# Patient Record
Sex: FEMALE | Race: ASIAN | NOT HISPANIC OR LATINO
[De-identification: names, ages, dates, MRNs, and addresses within clinical notes are randomized per-mention and may not be internally consistent; named-entity substitution may affect disease eponyms.]

---

## 2020-12-16 ENCOUNTER — APPOINTMENT (OUTPATIENT)
Dept: ORTHOPEDIC SURGERY | Facility: CLINIC | Age: 65
End: 2020-12-16
Payer: COMMERCIAL

## 2020-12-16 ENCOUNTER — NON-APPOINTMENT (OUTPATIENT)
Age: 65
End: 2020-12-16

## 2020-12-16 DIAGNOSIS — Z86.39 PERSONAL HISTORY OF OTHER ENDOCRINE, NUTRITIONAL AND METABOLIC DISEASE: ICD-10-CM

## 2020-12-16 DIAGNOSIS — Z86.79 PERSONAL HISTORY OF OTHER DISEASES OF THE CIRCULATORY SYSTEM: ICD-10-CM

## 2020-12-16 PROCEDURE — 99204 OFFICE O/P NEW MOD 45 MIN: CPT

## 2020-12-16 PROCEDURE — 72110 X-RAY EXAM L-2 SPINE 4/>VWS: CPT

## 2020-12-16 PROCEDURE — 99072 ADDL SUPL MATRL&STAF TM PHE: CPT

## 2020-12-16 NOTE — PHYSICAL EXAM
[de-identified] : General: No acute distress, conversant, well-nourished.\par Head: Normocephalic, atraumatic\par Neck: trachea midline, FROM\par Heart: normotensive and normal rate and rhythm\par Lungs: No labored breathing\par Skin: No abrasions, no rashes, no edema\par Psych: Alert and oriented to person, place and time\par Extremities: no peripheral edema or digital cyanosis\par Gait: Normal gait. Can perform tandem gait.  \par Vascular: warm and well perfused distally, palpable distal pulses\par \par MSK:\par Lumbar spine:\par + tenderness to palpation.  No step-off, no deformity.\par \par NEURO EXAM:\par Sensation \par Left L2  -  2/2            \par Left L3  -  2/2\par Left L4  -  2/2\par Left L5  -  2/2\par Left S1  -  2/2\par \par Right L2  -  2/2            \par Right L3  -  2/2\par Right L4  -  2/2\par Right L5  -  2/2\par Right S1  -  2/2\par \par Motor: \par Left L2 (hip flexion)                            5/5                \par Left L3 (knee extension)                   5/5                \par Left L4 (ankle dorsiflexion)                 5/5                \par Left L5 (long toe extensor)                5/5                \par Left S1 (ankle plantar flexion)           5/5\par \par Right L2 (hip flexion)                            5/5                \par Right L3 (knee extension)                   5/5                \par Right L4 (ankle dorsiflexion)                 5/5                \par Right L5 (long toe extensor)                5/5                \par Right S1 (ankle plantar flexion)           5/5\par \par Reflexes: Normal and symmetric\par Negative clonus.  Down-going Babinski.   [de-identified] : Lumbar AP, lateral, flexion and extension radiographs obtained in the office today shows no fracture or dislocation.  Straightening of lumbar spine.  Lumbar spondylosis. No instability on dynamic films.

## 2020-12-16 NOTE — HISTORY OF PRESENT ILLNESS
[de-identified] : 65 year old female presents with low back pain radiating into her right thigh.  She cannot recall an inciting event or trauma.  She says the pain is worse with activity or prolonged standing.  She has been taking Tylenol without relief.  Rest helps.  She does report paresthesias.  She denies any weakness, balance problems, urinary retention or fecal incontinence.

## 2020-12-16 NOTE — ASSESSMENT
[FreeTextEntry1] : 65 year old female with low back pain radiating to her right thigh.  She has paresthesias but is otherwise neurologically intact.  She was given a referral for physical therapy. She was given prescriptions for diclofenac and gabapentin.  She will followup in 1 month.  She knows to call with any questions or concerns or if her symptoms acutely worsen.

## 2021-02-17 ENCOUNTER — APPOINTMENT (OUTPATIENT)
Dept: ORTHOPEDIC SURGERY | Facility: CLINIC | Age: 66
End: 2021-02-17
Payer: COMMERCIAL

## 2021-02-17 ENCOUNTER — TRANSCRIPTION ENCOUNTER (OUTPATIENT)
Age: 66
End: 2021-02-17

## 2021-02-17 DIAGNOSIS — R20.0 ANESTHESIA OF SKIN: ICD-10-CM

## 2021-02-17 DIAGNOSIS — R20.2 ANESTHESIA OF SKIN: ICD-10-CM

## 2021-02-17 PROCEDURE — 99214 OFFICE O/P EST MOD 30 MIN: CPT

## 2021-02-17 PROCEDURE — 99072 ADDL SUPL MATRL&STAF TM PHE: CPT

## 2021-02-17 NOTE — HISTORY OF PRESENT ILLNESS
[de-identified] : 65 year old female returns for followup of low back pain radiating right thigh.  The pain has now started to radiate all the way down to her ankle.  She has been taking gabapentin and Diclofenac with minimal relief.  She has been doing physical therapy without improvement.  She reports numbness and paresthesias in her legs right worse than left.  She denies any weakness, balance problems, urinary retention or fecal incontinence.

## 2021-02-17 NOTE — ASSESSMENT
[FreeTextEntry1] : 65 year old female followup for lumbar radiculopathy.  She was last seen 2 months ago.  She has paresthesias but is otherwise neurologically intact.  She has been doing physical therapy without improvement. She has been taking gabapentin and diclofenac without improvement.  The pain is now radiating down to her right ankle.  She was given a referral for a lumbar MRI.   She will continue PT.  She will followup once the MRI has been completed.  She knows to call with any questions or concerns or if her symptoms acutely worsen.

## 2021-02-17 NOTE — PHYSICAL EXAM
[de-identified] : General: No acute distress, conversant, well-nourished.\par Head: Normocephalic, atraumatic\par Neck: trachea midline, FROM\par Heart: normotensive and normal rate and rhythm\par Lungs: No labored breathing\par Skin: No abrasions, no rashes, no edema\par Psych: Alert and oriented to person, place and time\par Extremities: no peripheral edema or digital cyanosis\par Gait: Normal gait. Can perform tandem gait.  \par Vascular: warm and well perfused distally, palpable distal pulses\par \par MSK:\par Lumbar spine:\par + tenderness to palpation.  No step-off, no deformity.\par \par NEURO EXAM:\par Sensation \par Left L2  -  2/2            \par Left L3  -  2/2\par Left L4  -  2/2\par Left L5  -  2/2\par Left S1  -  2/2\par \par Right L2  -  2/2            \par Right L3  -  2/2\par Right L4  -  2/2\par Right L5  -  2/2\par Right S1  -  2/2\par \par Motor: \par Left L2 (hip flexion)                            5/5                \par Left L3 (knee extension)                   5/5                \par Left L4 (ankle dorsiflexion)                 5/5                \par Left L5 (long toe extensor)                5/5                \par Left S1 (ankle plantar flexion)           5/5\par \par Right L2 (hip flexion)                            5/5                \par Right L3 (knee extension)                   5/5                \par Right L4 (ankle dorsiflexion)                 5/5                \par Right L5 (long toe extensor)                5/5                \par Right S1 (ankle plantar flexion)           5/5\par \par Reflexes: Normal and symmetric\par Negative clonus.  Down-going Babinski.   [de-identified] : Lumbar AP, lateral, flexion and extension radiographs (12/16/20) no fracture or dislocation.  Straightening of lumbar spine.  Lumbar spondylosis. No instability on dynamic films.

## 2021-02-25 ENCOUNTER — APPOINTMENT (OUTPATIENT)
Dept: ORTHOPEDIC SURGERY | Facility: CLINIC | Age: 66
End: 2021-02-25
Payer: COMMERCIAL

## 2021-02-25 PROCEDURE — 99214 OFFICE O/P EST MOD 30 MIN: CPT

## 2021-02-25 PROCEDURE — 99072 ADDL SUPL MATRL&STAF TM PHE: CPT

## 2021-02-26 NOTE — ASSESSMENT
[FreeTextEntry1] : 65 year old female with severe lumbar stenosis with neurogenic claudication and lumbar radiculopathy.  Her symptoms have progressively worsened over the last 3 months.  The pain limits the distance she can walk forcing her to sit and rest. She reports paresthesias in the legs but is otherwise neurologically intact.  She has tried physical therapy without relief.  She as tried Diclofenac and gabapentin without improvement in her symtpoms.  We reviewed her lumbar MRI which showed congenital stenosis and well as severe L4-L5 central lumbar stenosis.  We discussed surgery and its alternatives.  Surgery would be a lumbar decompression: L3-L5 lumbar laminectomy, foraminotomy and partial facetectomy.  We discussed the risks and benefits of surgery.  Risks include anesthesia, blood loss, need for blood transfusion, clots, stroke, myocardial infarct, chronic pain, loss of function, infection, durotomy, damage to neurovascular structures and need for reoperation.  The patient asked several great questions all of which were answered.  The patient consented to proceed with surgery.  I also spoke with her neurologist Dr Sandoval regarding this plan.  The patient is having her Covid-19 vaccine and we will schedule her surgery over 2 weeks after her second dose.  She will require medical clearance and a covid-19 test prior to surgery.  She will followup prior to the surgery to review the information.  She knows to call with any questions or concerns or if her symptoms acutely worsen.

## 2021-02-26 NOTE — HISTORY OF PRESENT ILLNESS
[de-identified] : 65 year old female returns for followup for low back pain radiating to her legs: right worse than left.   She reports worsening leg pain and fatigue when walking.  She can only walk 1-2 blocks before the discomfort starts to bother her.  Sitting helps.  She also reports continued paresthesias into her legs when walking. She denies any weakness, balance problems, urinary retention or fecal incontinence. She has been doing physical therapy without relief.  She has also tried Diclofenac and gabapentin without relief.  She is here to review her recent lumbar MRI.  She also saw neurologist Matt Sandoval MD.

## 2021-02-26 NOTE — PHYSICAL EXAM
[de-identified] : General: No acute distress, conversant, well-nourished.\par Head: Normocephalic, atraumatic\par Neck: trachea midline, FROM\par Heart: normotensive and normal rate and rhythm\par Lungs: No labored breathing\par Skin: No abrasions, no rashes, no edema\par Psych: Alert and oriented to person, place and time\par Extremities: no peripheral edema or digital cyanosis\par Gait: Normal gait. Can perform tandem gait.  \par Vascular: warm and well perfused distally, palpable distal pulses\par \par MSK:\par Lumbar spine:\par + tenderness to palpation.  No step-off, no deformity.\par \par NEURO EXAM:\par Sensation \par Left L2  -  2/2            \par Left L3  -  2/2\par Left L4  -  2/2\par Left L5  -  2/2\par Left S1  -  2/2\par \par Right L2  -  2/2            \par Right L3  -  2/2\par Right L4  -  2/2\par Right L5  -  2/2\par Right S1  -  2/2\par \par Motor: \par Left L2 (hip flexion)                            5/5                \par Left L3 (knee extension)                   5/5                \par Left L4 (ankle dorsiflexion)                 5/5                \par Left L5 (long toe extensor)                5/5                \par Left S1 (ankle plantar flexion)           5/5\par \par Right L2 (hip flexion)                            5/5                \par Right L3 (knee extension)                   5/5                \par Right L4 (ankle dorsiflexion)                 5/5                \par Right L5 (long toe extensor)                5/5                \par Right S1 (ankle plantar flexion)           5/5\par \par Reflexes: Normal and symmetric\par Negative clonus.  Down-going Babinski.   [de-identified] : Lumbar MRI (2/23/21): Congenital stenosis leading to multilevel lumbar stenosis.  Moderate L3-L4 central stenosis.  Severe spinal canal stenosis at L4-L5 and mild to moderate multilevel bilateral foraminal stenoses.\par \par Lumbar AP, lateral, flexion and extension radiographs (12/16/20) no fracture or dislocation.  Straightening of lumbar spine.  Lumbar spondylosis. No instability on dynamic films.

## 2021-03-26 ENCOUNTER — APPOINTMENT (OUTPATIENT)
Dept: ORTHOPEDIC SURGERY | Facility: CLINIC | Age: 66
End: 2021-03-26
Payer: COMMERCIAL

## 2021-03-26 PROCEDURE — 99214 OFFICE O/P EST MOD 30 MIN: CPT

## 2021-03-26 PROCEDURE — 99072 ADDL SUPL MATRL&STAF TM PHE: CPT

## 2021-03-26 NOTE — ASSESSMENT
[FreeTextEntry1] : 65 year old female with severe lumbar stenosis with neurogenic claudication and lumbar radiculopathy. She has attempted conservative treatment with physical therapy, gabapentin and DIclofenac without sustained relief.  Her activities of daily living are impaired by the pain and the distance she walks is limited.  We reviewed her lumbar MRI which showed congenital stenosis and well as severe L4-L5 central lumbar stenosis.  We reviewed surgery and its alternatives.  Surgery would be a lumbar decompression: L3-L5 lumbar laminectomy, foraminotomy and partial facetectomy.  We discussed the risks and benefits of surgery.  Risks include anesthesia, blood loss, need for blood transfusion, clots, stroke, myocardial infarct, chronic pain, loss of function, infection, durotomy, damage to neurovascular structures and need for reoperation.  The patient asked several great questions all of which were answered.  The patient consented to proceed with surgery. She is scheduled for April 12, 2021.  She will be seeing her PCP for clearance and will require a Covid-19 test prior to surgery.  She knows to call with any questions or concerns or if her symptoms acutely worsen.

## 2021-03-26 NOTE — HISTORY OF PRESENT ILLNESS
[de-identified] : 65 year old female for lumbar stenosis with neurogenic claudication and radiculopathy.  She has been doing PT which helps but the pain in her legs still bothers her.  She has paresthesias as well.  She is taking gabapentin and Diclofenac with minimal relief.  She denies any weakness, balance problems, urinary retention or fecal incontinence. She has been doing physical therapy without relief. She is limited in her daily activities by the pain.

## 2021-03-26 NOTE — PHYSICAL EXAM
[de-identified] : General: No acute distress, conversant, well-nourished.\par Head: Normocephalic, atraumatic\par Neck: trachea midline, FROM\par Heart: normotensive and normal rate and rhythm\par Lungs: No labored breathing\par Skin: No abrasions, no rashes, no edema\par Psych: Alert and oriented to person, place and time\par Extremities: no peripheral edema or digital cyanosis\par Gait: Normal gait. Can perform tandem gait.  \par Vascular: warm and well perfused distally, palpable distal pulses\par \par MSK:\par Lumbar spine:\par + tenderness to palpation.  No step-off, no deformity.\par \par NEURO EXAM:\par Sensation \par Left L2  -  2/2            \par Left L3  -  2/2\par Left L4  -  2/2\par Left L5  -  2/2\par Left S1  -  2/2\par \par Right L2  -  2/2            \par Right L3  -  2/2\par Right L4  -  2/2\par Right L5  -  2/2\par Right S1  -  2/2\par \par Motor: \par Left L2 (hip flexion)                            5/5                \par Left L3 (knee extension)                   5/5                \par Left L4 (ankle dorsiflexion)                 5/5                \par Left L5 (long toe extensor)                5/5                \par Left S1 (ankle plantar flexion)           5/5\par \par Right L2 (hip flexion)                            5/5                \par Right L3 (knee extension)                   5/5                \par Right L4 (ankle dorsiflexion)                 5/5                \par Right L5 (long toe extensor)                5/5                \par Right S1 (ankle plantar flexion)           5/5\par \par Reflexes: Normal and symmetric\par Negative clonus.  Down-going Babinski.   [de-identified] : Lumbar MRI (2/23/21): Congenital stenosis leading to multilevel lumbar stenosis.  Moderate L3-L4 central stenosis.  Severe spinal canal stenosis at L4-L5 and mild to moderate multilevel bilateral foraminal stenoses.\par \par Lumbar AP, lateral, flexion and extension radiographs (12/16/20) no fracture or dislocation.  Straightening of lumbar spine.  Lumbar spondylosis. No instability on dynamic films.

## 2021-04-09 RX ORDER — POVIDONE-IODINE 5 %
1 AEROSOL (ML) TOPICAL ONCE
Refills: 0 | Status: COMPLETED | OUTPATIENT
Start: 2021-04-12 | End: 2021-04-12

## 2021-04-09 NOTE — H&P ADULT - NSICDXFAMILYHX_GEN_ALL_CORE_FT
FAMILY HISTORY:  Father  Still living? Unknown  Family history of CHF (congestive heart failure), Age at diagnosis: Age Unknown

## 2021-04-09 NOTE — H&P ADULT - NSHPLABSRESULTS_GEN_ALL_CORE
Cr: 0.75  preop testing  Echo: WNL per medical clearance 4/9  EKG: WNL per medical clearance   Preop CBC, BMP, PT/PTT/INR, UA within normal limits- reviewed by medical clearance.   CXR: Within normal limits, per medical clearance.

## 2021-04-09 NOTE — H&P ADULT - NSHPPHYSICALEXAM_GEN_ALL_CORE
GENERAL:  PE: GENERAL:  PE:  Decreased ROM secondary to pain. Rest of PE per medical clearance. GENERAL: Comfortable, pleasant, NAD, alert.   PE: Decreased ROM secondary to pain.   No deformity or open wounds. No rashes or lesions. EHL/TA/GS/FHL 5/5 BLE.  Sensation intact but decreased to left lateral foot compared to right. Skin warm and well perfused. DP palpable BLE. Cap refill brisk.   Rest of PE per medical clearance.

## 2021-04-09 NOTE — H&P ADULT - PROBLEM SELECTOR PLAN 1
Admit to Orthopaedic Service.  Presents today for elective L3-L5 LAMINECTOMY, FORAMINOTOMY, AND PARTIAL FACETECTOMY.  Pt medically stable and cleared for procedure today by  Admit to Orthopaedic Service.  Presents today for elective L3-L5 laminectomy, foraminotomy, partial facetectomy. Pt medically stable and cleared for procedure today by Dr. Hollis (Cardio), Dr. Rodriguez (Med)

## 2021-04-09 NOTE — PATIENT PROFILE ADULT - SBIRT REFERRAL
1125 pt tolerated taxol infusion without issue, pt to rtc 11/4/19, no distress noted upon d/c to home   SBIRT referral

## 2021-04-09 NOTE — H&P ADULT - HISTORY OF PRESENT ILLNESS
66yo f c/o low back pain x   Presents today for elective L3-L5 LAMINECTOMY, FORAMINOTOMY, AND PARTIAL FACETECTOMY.  64yo f c/o low back pain x 10 years without specific accident or injury to bring on pain. Pt reports she carried heavy objects/computers for years which she attributes to the pain. Pain radiates down left leg greater than right and is associated with some intermittent numbness and tingling on her left leg. Pt ambulates with no assistance at baseline.   Presents today for elective L3-L5 laminectomy, foraminotomy, partial facetectomy.

## 2021-04-11 ENCOUNTER — TRANSCRIPTION ENCOUNTER (OUTPATIENT)
Age: 66
End: 2021-04-11

## 2021-04-12 ENCOUNTER — APPOINTMENT (OUTPATIENT)
Dept: ORTHOPEDIC SURGERY | Facility: HOSPITAL | Age: 66
End: 2021-04-12

## 2021-04-12 ENCOUNTER — INPATIENT (INPATIENT)
Facility: HOSPITAL | Age: 66
LOS: 0 days | Discharge: HOME | DRG: 520 | End: 2021-04-13
Attending: STUDENT IN AN ORGANIZED HEALTH CARE EDUCATION/TRAINING PROGRAM | Admitting: STUDENT IN AN ORGANIZED HEALTH CARE EDUCATION/TRAINING PROGRAM
Payer: COMMERCIAL

## 2021-04-12 VITALS
SYSTOLIC BLOOD PRESSURE: 115 MMHG | RESPIRATION RATE: 20 BRPM | OXYGEN SATURATION: 97 % | DIASTOLIC BLOOD PRESSURE: 71 MMHG | HEART RATE: 69 BPM | TEMPERATURE: 99 F | HEIGHT: 65 IN | WEIGHT: 135.58 LBS

## 2021-04-12 DIAGNOSIS — E78.5 HYPERLIPIDEMIA, UNSPECIFIED: ICD-10-CM

## 2021-04-12 DIAGNOSIS — Z98.890 OTHER SPECIFIED POSTPROCEDURAL STATES: Chronic | ICD-10-CM

## 2021-04-12 DIAGNOSIS — M54.9 DORSALGIA, UNSPECIFIED: ICD-10-CM

## 2021-04-12 LAB
BLD GP AB SCN SERPL QL: NEGATIVE — SIGNIFICANT CHANGE UP
RH IG SCN BLD-IMP: POSITIVE — SIGNIFICANT CHANGE UP

## 2021-04-12 PROCEDURE — 63048 LAM FACETEC &FORAMOT EA ADDL: CPT

## 2021-04-12 PROCEDURE — 99253 IP/OBS CNSLTJ NEW/EST LOW 45: CPT

## 2021-04-12 PROCEDURE — 63047 LAM FACETEC & FORAMOT LUMBAR: CPT

## 2021-04-12 RX ORDER — CEFAZOLIN SODIUM 1 G
2000 VIAL (EA) INJECTION EVERY 8 HOURS
Refills: 0 | Status: COMPLETED | OUTPATIENT
Start: 2021-04-12 | End: 2021-04-13

## 2021-04-12 RX ORDER — ONDANSETRON 8 MG/1
4 TABLET, FILM COATED ORAL EVERY 6 HOURS
Refills: 0 | Status: DISCONTINUED | OUTPATIENT
Start: 2021-04-12 | End: 2021-04-13

## 2021-04-12 RX ORDER — BUPIVACAINE 13.3 MG/ML
20 INJECTION, SUSPENSION, LIPOSOMAL INFILTRATION ONCE
Refills: 0 | Status: DISCONTINUED | OUTPATIENT
Start: 2021-04-12 | End: 2021-04-13

## 2021-04-12 RX ORDER — CHLORHEXIDINE GLUCONATE 213 G/1000ML
1 SOLUTION TOPICAL ONCE
Refills: 0 | Status: COMPLETED | OUTPATIENT
Start: 2021-04-12 | End: 2021-04-12

## 2021-04-12 RX ORDER — FLUTICASONE PROPIONATE 50 MCG
1 SPRAY, SUSPENSION NASAL
Refills: 0 | Status: DISCONTINUED | OUTPATIENT
Start: 2021-04-12 | End: 2021-04-13

## 2021-04-12 RX ORDER — FLUTICASONE PROPIONATE 220 MCG
1 AEROSOL WITH ADAPTER (GRAM) INHALATION
Qty: 0 | Refills: 0 | DISCHARGE

## 2021-04-12 RX ORDER — APREPITANT 80 MG/1
40 CAPSULE ORAL ONCE
Refills: 0 | Status: COMPLETED | OUTPATIENT
Start: 2021-04-12 | End: 2021-04-12

## 2021-04-12 RX ORDER — ATORVASTATIN CALCIUM 80 MG/1
40 TABLET, FILM COATED ORAL AT BEDTIME
Refills: 0 | Status: DISCONTINUED | OUTPATIENT
Start: 2021-04-12 | End: 2021-04-13

## 2021-04-12 RX ORDER — SENNA PLUS 8.6 MG/1
2 TABLET ORAL AT BEDTIME
Refills: 0 | Status: DISCONTINUED | OUTPATIENT
Start: 2021-04-12 | End: 2021-04-13

## 2021-04-12 RX ORDER — SODIUM CHLORIDE 9 MG/ML
1000 INJECTION, SOLUTION INTRAVENOUS
Refills: 0 | Status: DISCONTINUED | OUTPATIENT
Start: 2021-04-13 | End: 2021-04-13

## 2021-04-12 RX ORDER — GABAPENTIN 400 MG/1
300 CAPSULE ORAL ONCE
Refills: 0 | Status: COMPLETED | OUTPATIENT
Start: 2021-04-12 | End: 2021-04-12

## 2021-04-12 RX ORDER — HYDROMORPHONE HYDROCHLORIDE 2 MG/ML
0.5 INJECTION INTRAMUSCULAR; INTRAVENOUS; SUBCUTANEOUS EVERY 4 HOURS
Refills: 0 | Status: COMPLETED | OUTPATIENT
Start: 2021-04-12 | End: 2021-04-19

## 2021-04-12 RX ORDER — GABAPENTIN 400 MG/1
0 CAPSULE ORAL
Qty: 0 | Refills: 0 | DISCHARGE

## 2021-04-12 RX ORDER — OXYCODONE HYDROCHLORIDE 5 MG/1
10 TABLET ORAL EVERY 4 HOURS
Refills: 0 | Status: DISCONTINUED | OUTPATIENT
Start: 2021-04-12 | End: 2021-04-13

## 2021-04-12 RX ORDER — GABAPENTIN 400 MG/1
100 CAPSULE ORAL DAILY
Refills: 0 | Status: DISCONTINUED | OUTPATIENT
Start: 2021-04-12 | End: 2021-04-13

## 2021-04-12 RX ORDER — PANTOPRAZOLE SODIUM 20 MG/1
40 TABLET, DELAYED RELEASE ORAL
Refills: 0 | Status: DISCONTINUED | OUTPATIENT
Start: 2021-04-12 | End: 2021-04-13

## 2021-04-12 RX ORDER — HYDROMORPHONE HYDROCHLORIDE 2 MG/ML
0.5 INJECTION INTRAMUSCULAR; INTRAVENOUS; SUBCUTANEOUS
Refills: 0 | Status: COMPLETED | OUTPATIENT
Start: 2021-04-12 | End: 2021-04-12

## 2021-04-12 RX ORDER — BUDESONIDE, GLYCOPYRROLATE, AND FORMOTEROL FUMARATE 160; 9; 4.8 UG/1; UG/1; UG/1
2 AEROSOL, METERED RESPIRATORY (INHALATION)
Qty: 0 | Refills: 0 | DISCHARGE

## 2021-04-12 RX ORDER — OXYCODONE HYDROCHLORIDE 5 MG/1
5 TABLET ORAL EVERY 4 HOURS
Refills: 0 | Status: DISCONTINUED | OUTPATIENT
Start: 2021-04-12 | End: 2021-04-13

## 2021-04-12 RX ORDER — ACETAMINOPHEN 500 MG
975 TABLET ORAL EVERY 8 HOURS
Refills: 0 | Status: DISCONTINUED | OUTPATIENT
Start: 2021-04-12 | End: 2021-04-13

## 2021-04-12 RX ORDER — ACETAMINOPHEN 500 MG
1000 TABLET ORAL ONCE
Refills: 0 | Status: COMPLETED | OUTPATIENT
Start: 2021-04-12 | End: 2021-04-12

## 2021-04-12 RX ADMIN — Medication 100 MILLIGRAM(S): at 17:43

## 2021-04-12 RX ADMIN — HYDROMORPHONE HYDROCHLORIDE 0.5 MILLIGRAM(S): 2 INJECTION INTRAMUSCULAR; INTRAVENOUS; SUBCUTANEOUS at 13:21

## 2021-04-12 RX ADMIN — Medication 1 APPLICATION(S): at 06:36

## 2021-04-12 RX ADMIN — CHLORHEXIDINE GLUCONATE 1 APPLICATION(S): 213 SOLUTION TOPICAL at 06:37

## 2021-04-12 RX ADMIN — ATORVASTATIN CALCIUM 40 MILLIGRAM(S): 80 TABLET, FILM COATED ORAL at 22:13

## 2021-04-12 RX ADMIN — GABAPENTIN 300 MILLIGRAM(S): 400 CAPSULE ORAL at 06:37

## 2021-04-12 RX ADMIN — SENNA PLUS 2 TABLET(S): 8.6 TABLET ORAL at 22:13

## 2021-04-12 RX ADMIN — Medication 1 SPRAY(S): at 23:07

## 2021-04-12 RX ADMIN — APREPITANT 40 MILLIGRAM(S): 80 CAPSULE ORAL at 06:37

## 2021-04-12 RX ADMIN — Medication 1000 MILLIGRAM(S): at 06:36

## 2021-04-12 NOTE — PACU DISCHARGE NOTE - COMMENTS
Verbal report given to JEISON guzman v/zeferino boyd, pt. to be transferred to [facility],[id number], pt. voicing no complaints.

## 2021-04-12 NOTE — CONSULT NOTE ADULT - ASSESSMENT
66 yo Female with Pmhx of chronic low back pain, admitted for elective L3-L5 laminectomy, foraminotomy, partial facetectomy with post induction course complicated by sinus bradycardia for which she recieved Atropine with good response    1) SInus Bradycardia Post Anesthesia for Orthopedic procedure  -Patient with Bradycardia to the 30's after induction per report. No rythm strip available in chart for analysis. Reportedly Sinus bradycardia. Patient recieved 0.5 mg IV of atropine with good response  -Clinically patient is post surgery feeling well without complaints.  -EKG reviewed showing NSR and TWI from V1-V4 present on pre op EKG. Pts heart on the monitor ranging from 70's -80's with good chronotropy with moving around  -At this time believed marked sinus bradycardia related to anesthesia  -Would Ensure Electrolytes within normal range  -Would keep on tele post operatively   -Keep K>4 and Mag>2.  -Please continue to avoid any AVN agents  -Please obtain daily EKGs  -Cardiology will follow

## 2021-04-12 NOTE — CONSULT NOTE ADULT - ATTENDING COMMENTS
Initial attending contact date   4/12/21   . See fellow note written above for details. I reviewed the fellow documentation. I have personally seen and examined this patient. I reviewed vitals, labs, medications, cardiac studies, and additional imaging. I agree with the above fellow's findings and plans as written above with the following additions/statements.    -Cardiology consulted for mao in 30s post induction  -likely sinus mao related to anesthesia  -in PACU, pt with HR 60-80s  -Without complaints of dizziness, SOB, CP  -EKG NSR with ST changes unchanged from pre-op EKG  - No need for further cardiac work up at this time  -Will cont to monitor on tele

## 2021-04-12 NOTE — CONSULT NOTE ADULT - SUBJECTIVE AND OBJECTIVE BOX
Patient is a 64 yo Female with Pmhx of chronic low back pain, admitted for elective L3-L5 laminectomy, foraminotomy, partial facetectomy with post induction course complicated by sinus bradycardia for which she recieved Atropine with good response    Patient seen and examined at bedside in the PACU. Reports feeling well but sleepy from her anesthesia. Pt's pre -op echo showed Ventricular bigeminy with extra PVCs for which her outpatientr cardiology did an echo that was reportedly normal. Currently patient with HR in the 80's range without pause, PVCS or arrythmias.         Home Medications:  Aspirin Enteric Coated 81 mg oral delayed release tablet: 1 tab(s) orally once a day (12 Apr 2021 06:29)  Breztri Aerosphere inhalation aerosol: 2 puff(s) inhaled 2 times a day (12 Apr 2021 06:29)  Crestor 10 mg oral tablet: 1 tab(s) orally once a day (at bedtime) (12 Apr 2021 06:29)  D3:   2,000 IU orally daily (12 Apr 2021 06:29)  diclofenac sodium 75 mg oral delayed release tablet: 1 tab(s) orally 2 times a day (12 Apr 2021 06:29)  fluticasone 50 mcg/inh inhalation powder: 1 puff(s) inhaled 2 times a day (12 Apr 2021 06:29)  gabapentin 100 mg oral tablet: orally once a day (12 Apr 2021 06:29)      MEDICATIONS  (STANDING):  atorvastatin 40 milliGRAM(s) Oral at bedtime  BUpivacaine liposome 1.3% Injectable (no eMAR) 20 milliLiter(s) Local Injection once  ceFAZolin   IVPB 2000 milliGRAM(s) IV Intermittent every 8 hours  fluticasone propionate 50 MICROgram(s)/spray Nasal Spray 1 Spray(s) Both Nostrils two times a day  gabapentin 100 milliGRAM(s) Oral daily  pantoprazole    Tablet 40 milliGRAM(s) Oral before breakfast  senna 2 Tablet(s) Oral at bedtime    MEDICATIONS  (PRN):  acetaminophen   Tablet .. 975 milliGRAM(s) Oral every 8 hours PRN Temp greater or equal to 38C (100.4F), Mild Pain (1 - 3)  HYDROmorphone  Injectable 0.5 milliGRAM(s) IV Push every 4 hours PRN breakthrough pain  ondansetron Injectable 4 milliGRAM(s) IV Push every 6 hours PRN Nausea and/or Vomiting  oxyCODONE    IR 5 milliGRAM(s) Oral every 4 hours PRN Moderate Pain (4 - 6)  oxyCODONE    IR 10 milliGRAM(s) Oral every 4 hours PRN Severe Pain (7 - 10)    .  VITAL SIGNS:  T(C): 36.4 (04-12-21 @ 15:16), Max: 37.1 (04-12-21 @ 06:08)  T(F): 97.6 (04-12-21 @ 15:16), Max: 98.7 (04-12-21 @ 06:08)  HR: 87 (04-12-21 @ 15:16) (69 - 91)  BP: 111/62 (04-12-21 @ 15:16) (97/52 - 121/66)  BP(mean): 80 (04-12-21 @ 15:16) (66 - 88)  RR: 10 (04-12-21 @ 15:16) (10 - 24)  SpO2: 100% (04-12-21 @ 15:16) (97% - 100%)  Wt(kg): --    PHYSICAL EXAM:    Constitutional: WDWN resting comfortably in bed; NAD  Head: NC/AT  ENT: no nasal discharge; uvula midline, no oropharyngeal erythema or exudates; MMM  Neck: supple; no JVD   Respiratory: CTA B/L; no W/R/R,   Cardiac: +S1/S2; RRR; no M/R/G;   Gastrointestinal: soft, NT/ND; no rebound or guarding; +BS  Extremities: WWP,  no peripheral edema  Musculoskeletal: NROM x4; no joint swelling, tenderness or erythema  Vascular: 2+ radial, femoral, DP/PT pulses B/L  Neurologic: AAOx3; CNII-XII grossly intact; no focal deficits        .  LABS:     -----        RADIOLOGY, EKG & ADDITIONAL TESTS: Reviewed.

## 2021-04-13 ENCOUNTER — TRANSCRIPTION ENCOUNTER (OUTPATIENT)
Age: 66
End: 2021-04-13

## 2021-04-13 VITALS — DIASTOLIC BLOOD PRESSURE: 63 MMHG | SYSTOLIC BLOOD PRESSURE: 132 MMHG | HEART RATE: 78 BPM | RESPIRATION RATE: 16 BRPM

## 2021-04-13 LAB
ANION GAP SERPL CALC-SCNC: 9 MMOL/L — SIGNIFICANT CHANGE UP (ref 5–17)
BUN SERPL-MCNC: 13 MG/DL — SIGNIFICANT CHANGE UP (ref 7–23)
CALCIUM SERPL-MCNC: 9 MG/DL — SIGNIFICANT CHANGE UP (ref 8.4–10.5)
CHLORIDE SERPL-SCNC: 105 MMOL/L — SIGNIFICANT CHANGE UP (ref 96–108)
CO2 SERPL-SCNC: 24 MMOL/L — SIGNIFICANT CHANGE UP (ref 22–31)
COVID-19 SPIKE DOMAIN AB INTERP: POSITIVE
COVID-19 SPIKE DOMAIN ANTIBODY RESULT: >250 U/ML — HIGH
CREAT SERPL-MCNC: 0.71 MG/DL — SIGNIFICANT CHANGE UP (ref 0.5–1.3)
GLUCOSE SERPL-MCNC: 119 MG/DL — HIGH (ref 70–99)
HCT VFR BLD CALC: 29.2 % — LOW (ref 34.5–45)
HGB BLD-MCNC: 9.3 G/DL — LOW (ref 11.5–15.5)
MCHC RBC-ENTMCNC: 28.1 PG — SIGNIFICANT CHANGE UP (ref 27–34)
MCHC RBC-ENTMCNC: 31.8 GM/DL — LOW (ref 32–36)
MCV RBC AUTO: 88.2 FL — SIGNIFICANT CHANGE UP (ref 80–100)
NRBC # BLD: 0 /100 WBCS — SIGNIFICANT CHANGE UP (ref 0–0)
PLATELET # BLD AUTO: 178 K/UL — SIGNIFICANT CHANGE UP (ref 150–400)
POTASSIUM SERPL-MCNC: 4.5 MMOL/L — SIGNIFICANT CHANGE UP (ref 3.5–5.3)
POTASSIUM SERPL-SCNC: 4.5 MMOL/L — SIGNIFICANT CHANGE UP (ref 3.5–5.3)
RBC # BLD: 3.31 M/UL — LOW (ref 3.8–5.2)
RBC # FLD: 13.8 % — SIGNIFICANT CHANGE UP (ref 10.3–14.5)
SARS-COV-2 IGG+IGM SERPL QL IA: >250 U/ML — HIGH
SARS-COV-2 IGG+IGM SERPL QL IA: POSITIVE
SODIUM SERPL-SCNC: 138 MMOL/L — SIGNIFICANT CHANGE UP (ref 135–145)
WBC # BLD: 9 K/UL — SIGNIFICANT CHANGE UP (ref 3.8–10.5)
WBC # FLD AUTO: 9 K/UL — SIGNIFICANT CHANGE UP (ref 3.8–10.5)

## 2021-04-13 PROCEDURE — 94640 AIRWAY INHALATION TREATMENT: CPT

## 2021-04-13 PROCEDURE — 86900 BLOOD TYPING SEROLOGIC ABO: CPT

## 2021-04-13 PROCEDURE — C1889: CPT

## 2021-04-13 PROCEDURE — 97161 PT EVAL LOW COMPLEX 20 MIN: CPT

## 2021-04-13 PROCEDURE — 76000 FLUOROSCOPY <1 HR PHYS/QHP: CPT

## 2021-04-13 PROCEDURE — 86850 RBC ANTIBODY SCREEN: CPT

## 2021-04-13 PROCEDURE — 36415 COLL VENOUS BLD VENIPUNCTURE: CPT

## 2021-04-13 PROCEDURE — 80048 BASIC METABOLIC PNL TOTAL CA: CPT

## 2021-04-13 PROCEDURE — 86769 SARS-COV-2 COVID-19 ANTIBODY: CPT

## 2021-04-13 PROCEDURE — 99233 SBSQ HOSP IP/OBS HIGH 50: CPT

## 2021-04-13 PROCEDURE — 86901 BLOOD TYPING SEROLOGIC RH(D): CPT

## 2021-04-13 PROCEDURE — 85027 COMPLETE CBC AUTOMATED: CPT

## 2021-04-13 RX ORDER — DICLOFENAC SODIUM 75 MG/1
1 TABLET, DELAYED RELEASE ORAL
Qty: 0 | Refills: 0 | DISCHARGE

## 2021-04-13 RX ORDER — BENZOCAINE AND MENTHOL 5; 1 G/100ML; G/100ML
1 LIQUID ORAL EVERY 4 HOURS
Refills: 0 | Status: DISCONTINUED | OUTPATIENT
Start: 2021-04-13 | End: 2021-04-13

## 2021-04-13 RX ORDER — ACETAMINOPHEN 500 MG
975 TABLET ORAL EVERY 8 HOURS
Refills: 0 | Status: DISCONTINUED | OUTPATIENT
Start: 2021-04-13 | End: 2021-04-13

## 2021-04-13 RX ORDER — POLYETHYLENE GLYCOL 3350 17 G/17G
17 POWDER, FOR SOLUTION ORAL DAILY
Refills: 0 | Status: DISCONTINUED | OUTPATIENT
Start: 2021-04-13 | End: 2021-04-13

## 2021-04-13 RX ORDER — OXYCODONE HYDROCHLORIDE 5 MG/1
1 TABLET ORAL
Qty: 20 | Refills: 0
Start: 2021-04-13 | End: 2021-04-17

## 2021-04-13 RX ORDER — ASPIRIN/CALCIUM CARB/MAGNESIUM 324 MG
1 TABLET ORAL
Qty: 0 | Refills: 0 | DISCHARGE

## 2021-04-13 RX ORDER — HYDROMORPHONE HYDROCHLORIDE 2 MG/ML
0.5 INJECTION INTRAMUSCULAR; INTRAVENOUS; SUBCUTANEOUS EVERY 4 HOURS
Refills: 0 | Status: DISCONTINUED | OUTPATIENT
Start: 2021-04-13 | End: 2021-04-13

## 2021-04-13 RX ORDER — METHOCARBAMOL 500 MG/1
500 TABLET, FILM COATED ORAL THREE TIMES A DAY
Refills: 0 | Status: DISCONTINUED | OUTPATIENT
Start: 2021-04-13 | End: 2021-04-13

## 2021-04-13 RX ORDER — ACETAMINOPHEN 500 MG
3 TABLET ORAL
Qty: 0 | Refills: 0 | DISCHARGE
Start: 2021-04-13

## 2021-04-13 RX ADMIN — PANTOPRAZOLE SODIUM 40 MILLIGRAM(S): 20 TABLET, DELAYED RELEASE ORAL at 06:15

## 2021-04-13 RX ADMIN — Medication 100 MILLIGRAM(S): at 01:17

## 2021-04-13 RX ADMIN — Medication 1 SPRAY(S): at 06:15

## 2021-04-13 RX ADMIN — POLYETHYLENE GLYCOL 3350 17 GRAM(S): 17 POWDER, FOR SOLUTION ORAL at 12:14

## 2021-04-13 RX ADMIN — GABAPENTIN 100 MILLIGRAM(S): 400 CAPSULE ORAL at 12:14

## 2021-04-13 RX ADMIN — Medication 975 MILLIGRAM(S): at 15:31

## 2021-04-13 RX ADMIN — SODIUM CHLORIDE 120 MILLILITER(S): 9 INJECTION, SOLUTION INTRAVENOUS at 01:17

## 2021-04-13 NOTE — DISCHARGE NOTE PROVIDER - NSDCMRMEDTOKEN_GEN_ALL_CORE_FT
Aspirin Enteric Coated 81 mg oral delayed release tablet: 1 tab(s) orally once a day  Breztri Aerosphere inhalation aerosol: 2 puff(s) inhaled 2 times a day  Crestor 10 mg oral tablet: 1 tab(s) orally once a day (at bedtime)  D3:   2,000 IU orally daily  diclofenac sodium 75 mg oral delayed release tablet: 1 tab(s) orally 2 times a day  fluticasone 50 mcg/inh inhalation powder: 1 puff(s) inhaled 2 times a day  gabapentin 100 mg oral tablet: orally once a day   acetaminophen 325 mg oral tablet: 3 tab(s) orally every 8 hours  Aspirin Enteric Coated 81 mg oral delayed release tablet: 1 tab(s) orally once a day  can restart 4/14/21  Breztri Aerosphere inhalation aerosol: 2 puff(s) inhaled 2 times a day  Crestor 10 mg oral tablet: 1 tab(s) orally once a day (at bedtime)  D3:   2,000 IU orally daily  fluticasone 50 mcg/inh inhalation powder: 1 puff(s) inhaled 2 times a day  gabapentin 100 mg oral tablet: orally once a day  oxyCODONE 5 mg oral tablet: 1 tab(s) orally every 6 hours, as needed for severe pain MDD:4

## 2021-04-13 NOTE — DISCHARGE NOTE PROVIDER - NSDCFUADDINST_GEN_ALL_CORE_FT
No strenuous activity (bending/twisting), heavy lifting, driving, exercising/gym activities or returning to work until cleared by MD.   Change dressing daily with gauze/tape till post-op day #5, then leave incision open to air.  You may shower post-op day#5, keep incision clean and dry.   Try to have regular bowel movements, take stool softener or laxative if necessary.  May take pepcid for upset stomach.  May apply ice to affected areas to decrease swelling.  Call to schedule an appt with Dr. Kinney for follow up, if you have staples or sutures they will be removed in office.  Contact your doctor if you experience: fever greater than 101.5, chills, chest pain, difficulty breathing, redness or excessive drainage around the incision, other concerns.  Follow up with your primary care provider.

## 2021-04-13 NOTE — PROGRESS NOTE ADULT - ASSESSMENT
64 yo Female with Pmhx of chronic low back pain, admitted for elective L3-L5 laminectomy, foraminotomy, partial facetectomy with post induction course complicated by sinus bradycardia for which she recieved Atropine with good response    1) SInus Bradycardia Post Anesthesia for Orthopedic procedure  -Patient with Bradycardia to the 30's after induction per report. No rythm strip available in chart for analysis. Reportedly Sinus bradycardia. Patient recieved 0.5 mg IV of atropine with good response  -Clinically patient is post surgery feeling well without complaints.  -Tele reviewed. No pauses, or episodes of bradycardia noted on tele  -Recommend outpatient follow up with patient's cardiologist within 2 weeks of discharge. Please ensure she has an appointment prior to DC  -Please reconsult cardiology as needed

## 2021-04-13 NOTE — PROGRESS NOTE ADULT - ATTENDING COMMENTS
María Damico is a 65 year old female s/p L3-L4 laminectomy, bilateral foraminotomies and partial facetectomies POD#1.  Patient is ambulating well.  Her pain is well controlled.  She is neurologically intact.  Will continue drain and reassess output this afternoon.  PT/OT today.  Dispo planning.
Initial attending contact date  4/12/21   . See fellow note written above for details. I reviewed the fellow documentation. I have personally seen and examined this patient. I reviewed vitals, labs, medications, cardiac studies, and additional imaging. I agree with the above fellow's findings and plans as written above with the following additions/statements.    -No further bradyarrhythmias on tele  -DOing well without active complaints  -To fu with her outpatient cardiologist

## 2021-04-13 NOTE — OCCUPATIONAL THERAPY INITIAL EVALUATION ADULT - PERTINENT HX OF CURRENT PROBLEM, REHAB EVAL
66yo f c/o low back pain x 10 years without specific accident or injury to bring on pain. Pt reports she carried heavy objects/computers for years which she attributes to the pain. Pain radiates down left leg greater than right and is associated with some intermittent numbness and tingling on her left leg. Pt ambulates with no assistance at baseline. s/p L3-5 lami 4/12/21

## 2021-04-13 NOTE — PHYSICAL THERAPY INITIAL EVALUATION ADULT - PERTINENT HX OF CURRENT PROBLEM, REHAB EVAL
64yo f c/o low back pain x 10 years without specific accident or injury to bring on pain. Pt reports she carried heavy objects/computers for years which she attributes to the pain. Pain radiates down left leg greater than right and is associated with some intermittent numbness and tingling on her left leg.

## 2021-04-13 NOTE — DISCHARGE NOTE PROVIDER - NSDCCPCAREPLAN_GEN_ALL_CORE_FT
PRINCIPAL DISCHARGE DIAGNOSIS  Diagnosis: Back pain  Assessment and Plan of Treatment: improvement s/p Lami L3-5

## 2021-04-13 NOTE — DISCHARGE NOTE PROVIDER - HOSPITAL COURSE
Admitted  Surgery Lami L3-5  Danielle-op Antibiotics  Pain control  DVT prophylaxis  OOB/Physical Therapy

## 2021-04-13 NOTE — PHYSICAL THERAPY INITIAL EVALUATION ADULT - GENERAL OBSERVATIONS, REHAB EVAL
Pt received supine, +lumbar incision bandage C/D/I, +hemovac, +telemetry, +pulse ox, NAD, agreeable to PT.

## 2021-04-13 NOTE — DISCHARGE NOTE PROVIDER - CARE PROVIDER_API CALL
Linus Kinney)  [facility]  [address] [place] [address], [address] [place] [address]  Phone: [phone]  Fax: [phone]  Follow Up Time: 2 weeks

## 2021-04-13 NOTE — PROGRESS NOTE ADULT - SUBJECTIVE AND OBJECTIVE BOX
Interval HPI: Patient's HR has remained in the 60's-80's iwthout bradycardic episode of pauses. This am she feels wells      Home Medications:  Aspirin Enteric Coated 81 mg oral delayed release tablet: 1 tab(s) orally once a day (12 Apr 2021 06:29)  Breztri Aerosphere inhalation aerosol: 2 puff(s) inhaled 2 times a day (12 Apr 2021 06:29)  Crestor 10 mg oral tablet: 1 tab(s) orally once a day (at bedtime) (12 Apr 2021 06:29)  D3:   2,000 IU orally daily (12 Apr 2021 06:29)  diclofenac sodium 75 mg oral delayed release tablet: 1 tab(s) orally 2 times a day (12 Apr 2021 06:29)  fluticasone 50 mcg/inh inhalation powder: 1 puff(s) inhaled 2 times a day (12 Apr 2021 06:29)  gabapentin 100 mg oral tablet: orally once a day (12 Apr 2021 06:29)      MEDICATIONS  (STANDING):  acetaminophen   Tablet .. 975 milliGRAM(s) Oral every 8 hours  atorvastatin 40 milliGRAM(s) Oral at bedtime  BUpivacaine liposome 1.3% Injectable (no eMAR) 20 milliLiter(s) Local Injection once  fluticasone propionate 50 MICROgram(s)/spray Nasal Spray 1 Spray(s) Both Nostrils two times a day  gabapentin 100 milliGRAM(s) Oral daily  lactated ringers. 1000 milliLiter(s) (120 mL/Hr) IV Continuous <Continuous>  pantoprazole    Tablet 40 milliGRAM(s) Oral before breakfast  polyethylene glycol 3350 17 Gram(s) Oral daily  senna 2 Tablet(s) Oral at bedtime    MEDICATIONS  (PRN):  benzocaine 15 mG/menthol 3.6 mG Lozenge 1 Lozenge Oral every 4 hours PRN Sore Throat  HYDROmorphone  Injectable 0.5 milliGRAM(s) IV Push every 4 hours PRN breakthrough pain  methocarbamol 500 milliGRAM(s) Oral three times a day PRN muscle spasms  ondansetron Injectable 4 milliGRAM(s) IV Push every 6 hours PRN Nausea and/or Vomiting  oxyCODONE    IR 5 milliGRAM(s) Oral every 4 hours PRN Moderate Pain (4 - 6)  oxyCODONE    IR 10 milliGRAM(s) Oral every 4 hours PRN Severe Pain (7 - 10)    .  ICU Vital Signs Last 24 Hrs  T(C): 36.6 (13 Apr 2021 06:11), Max: 36.8 (13 Apr 2021 01:21)  T(F): 97.9 (13 Apr 2021 06:11), Max: 98.2 (13 Apr 2021 01:21)  HR: 78 (13 Apr 2021 14:42) (65 - 78)  BP: 132/63 (13 Apr 2021 14:42) (105/52 - 141/59)  BP(mean): 90 (13 Apr 2021 14:42) (85 - 90)  ABP: --  ABP(mean): --  RR: 16 (13 Apr 2021 14:42) (16 - 16)  SpO2: 98% (13 Apr 2021 12:18) (98% - 100%)  PHYSICAL EXAM:    Constitutional: WDWN resting comfortably in bed;   ENT: MMM  Neck: supple; no JVD   Respiratory: CTA B/L; no W/R/R,   Cardiac: +S1/S2; RRR; no M/R/G;   Gastrointestinal: soft, NT/ND; no rebound or guarding; +BS  Extremities: WWP,  no peripheral edema  Vascular: 2+ radial, femoral, DP/PT pulses B/L  Neurologic: AAOx3; CNII-XII grossly intact; no focal deficits      .  LABS:                         9.3    9.00  )-----------( 178      ( 13 Apr 2021 05:46 )             29.2     04-13    138  |  105  |  13  ----------------------------<  119<H>  4.5   |  24  |  0.71    Ca    9.0      13 Apr 2021 05:46        RADIOLOGY, EKG & ADDITIONAL TESTS: Reviewed.     
Orthopaedic Surgery Post Operative Check    Procedure: L3-5 laminectomy   Surgeon: Dr. Kinney     Pt comfortable without complaints, pain controlled  Denies CP, SOB, N/V, numbness/tingling    Vital Signs Last 24 Hrs  T(C): 36.2 (04-12-21 @ 12:27), Max: 36.2 (04-12-21 @ 12:27)  T(F): 97.2 (04-12-21 @ 12:27), Max: 97.2 (04-12-21 @ 12:27)  HR: 87 (04-12-21 @ 15:16) (82 - 91)  BP: 111/62 (04-12-21 @ 15:16) (97/52 - 121/66)  BP(mean): 80 (04-12-21 @ 15:16) (66 - 88)  RR: 10 (04-12-21 @ 15:16) (10 - 24)  SpO2: 100% (04-12-21 @ 15:16) (100% - 100%)  AVSS    General: Pt Alert and oriented, NAD  DSG check deferred 2/2 patient lying on back, hemovac x 1 holding suction   Pulses: DP pulses palpable bilateral lower extremities   Sensation: intact to bilateral lower extremities   Motor: EHL/FHL/TA/GS 5/5 bilateral lower extremities       A/P: 65yFemale POD#0 s/p laminectomy L3-5  - Stable  - Pain Control  - DVT ppx: SCDs  - Post op abx: ancef   - PT, WBS:  wbat   - f/u AM labs     Ortho Pager [phone]
Ortho Note    Pt comfortable without complaints, pain controlled  Denies CP, SOB, N/V, numbness/tingling. Patient had episode of bradycardia  after anesthesia induction which has remained stable after atropine administration.     Vital Signs Last 24 Hrs  T(C): 36.6 (04-13-21 @ 06:11), Max: 36.6 (04-13-21 @ 06:11)  T(F): 97.9 (04-13-21 @ 06:11), Max: 97.9 (04-13-21 @ 06:11)  HR: 72 (04-13-21 @ 08:35) (68 - 72)  BP: 124/60 (04-13-21 @ 08:35) (113/51 - 124/60)  BP(mean): 86 (04-13-21 @ 08:35) (86 - 86)  RR: 16 (04-13-21 @ 08:35) (16 - 16)  SpO2: 98% (04-13-21 @ 08:35) (98% - 98%)  AVSS    General: Pt Alert and oriented, NAD  DSG C/D/I; HV x 1 in place  Pulses: +2DP, WWP feet  Sensation: SILT BLE  Motor: 5/5 EHL/FHL/TA/GS                          9.3    9.00  )-----------( 178      ( 13 Apr 2021 05:46 )             29.2     04-13    138  |  105  |  13  ----------------------------<  119<H>  4.5   |  24  |  0.71    Ca    9.0      13 Apr 2021 05:46        A/P: 65yFemale POD#1 s/p L3-L5 laminectomy  - Stable  - Pain Control  - DVT ppx: SCDs  - PT, WBS: WBAT  - OOB for meals, I/S  - bowel regimen  - appreciate medicine recs; bradycardia resolved  - dispo: home pending PT clearance and HV removal    Ortho Pager [phone]
Ortho Note    Pt seen and examined at bedside this AM, comfortable without complaints, pain controlled. Denies CP, SOB, N/V, numbness/tingling     Vital Signs Last 24 Hrs  T(C): 36.6 (04-13-21 @ 06:11), Max: 36.6 (04-13-21 @ 06:11)  T(F): 97.9 (04-13-21 @ 06:11), Max: 97.9 (04-13-21 @ 06:11)  HR: 68 (04-13-21 @ 06:11) (68 - 68)  BP: 113/51 (04-13-21 @ 06:11) (113/51 - 113/51)  BP(mean): --  RR: 16 (04-13-21 @ 06:11) (16 - 16)  SpO2: 98% (04-13-21 @ 06:11) (98% - 98%)    General: Pt Alert and oriented, NAD  Back DSG C/D/I  HV x1 in place holding suction   Sensation intact BL LE  Motor strength intact BL LE    Drain output:    04-12-21 @ 07:01  -  04-13-21 @ 07:00  --------------------------------------------------------  OUT:    Accordian (mL): 185 mL  Total OUT: 185 mL      A/P: 65yFemale POD#1 s/p L3-L5 laminectomy 4/12   - Stable  - Pain Control  - DVT ppx: SCDs  - PT, WBS: WBAT  - Appreciate Cards recs - f/u AM EKG  - Continue drain and monitor output  - AM labs reviewed   - EMILIO robles once OOB with PT  - Dispo planning     Ortho Pager [phone]

## 2021-04-13 NOTE — DISCHARGE NOTE NURSING/CASE MANAGEMENT/SOCIAL WORK - PATIENT PORTAL LINK FT
You can access the FollowMyHealth Patient Portal offered by [facility] by registering at the following website: [email or web address]. By joining [email or web address]’s FollowMyHealth portal, you will also be able to view your health information using other applications (apps) compatible with our system.

## 2021-04-13 NOTE — OCCUPATIONAL THERAPY INITIAL EVALUATION ADULT - MANUAL MUSCLE TESTING RESULTS, REHAB EVAL
BUE/BLE at least 3+/5 based on functional performance/mobilty against gravity/grossly assessed due to

## 2021-04-13 NOTE — OCCUPATIONAL THERAPY INITIAL EVALUATION ADULT - STANDING BALANCE: DYNAMIC, REHAB EVAL
Pt engaged in functional mobility independently throughout hospital room; pt performed squat down to floor to  item without bending back to maintain spinal precautions independently/normal balance

## 2021-04-19 DIAGNOSIS — J44.9 CHRONIC OBSTRUCTIVE PULMONARY DISEASE, UNSPECIFIED: ICD-10-CM

## 2021-04-19 DIAGNOSIS — M48.062 SPINAL STENOSIS, LUMBAR REGION WITH NEUROGENIC CLAUDICATION: ICD-10-CM

## 2021-04-19 DIAGNOSIS — R00.8 OTHER ABNORMALITIES OF HEART BEAT: ICD-10-CM

## 2021-04-19 DIAGNOSIS — Z97.0 PRESENCE OF ARTIFICIAL EYE: ICD-10-CM

## 2021-04-19 DIAGNOSIS — Z98.890 OTHER SPECIFIED POSTPROCEDURAL STATES: ICD-10-CM

## 2021-04-19 DIAGNOSIS — E78.5 HYPERLIPIDEMIA, UNSPECIFIED: ICD-10-CM

## 2021-04-19 DIAGNOSIS — E11.9 TYPE 2 DIABETES MELLITUS WITHOUT COMPLICATIONS: ICD-10-CM

## 2021-04-19 DIAGNOSIS — M19.90 UNSPECIFIED OSTEOARTHRITIS, UNSPECIFIED SITE: ICD-10-CM

## 2021-04-19 DIAGNOSIS — I10 ESSENTIAL (PRIMARY) HYPERTENSION: ICD-10-CM

## 2021-04-19 DIAGNOSIS — R00.1 BRADYCARDIA, UNSPECIFIED: ICD-10-CM

## 2021-04-19 DIAGNOSIS — F41.9 ANXIETY DISORDER, UNSPECIFIED: ICD-10-CM

## 2021-04-19 DIAGNOSIS — M54.16 RADICULOPATHY, LUMBAR REGION: ICD-10-CM

## 2021-04-21 PROBLEM — E78.5 HYPERLIPIDEMIA, UNSPECIFIED: Chronic | Status: ACTIVE | Noted: 2021-04-09

## 2021-04-21 PROBLEM — M54.9 DORSALGIA, UNSPECIFIED: Chronic | Status: ACTIVE | Noted: 2021-04-09

## 2021-04-27 ENCOUNTER — APPOINTMENT (OUTPATIENT)
Dept: ORTHOPEDIC SURGERY | Facility: CLINIC | Age: 66
End: 2021-04-27
Payer: COMMERCIAL

## 2021-04-27 PROCEDURE — 99024 POSTOP FOLLOW-UP VISIT: CPT

## 2021-04-27 NOTE — HISTORY OF PRESENT ILLNESS
[___ Weeks Post Op] : [unfilled] weeks post op [de-identified] : s/p L3-L5 decompression [de-identified] : 65 year old female 2 weeks s/p L3-L5 decompression.  Patient reports pain controlled on Tylenol.  She has been mobilizing well.  Reports resolution of leg symptoms. Feels she is walking better.  She denies radicular pain, recent illness, fevers, numbness, weakness, balance problems, saddle anesthesia, urinary retention or fecal incontinence. [de-identified] : MSK:\par Lumbar spine:\par Incision well healed\par \par NEURO EXAM:\par Sensation \par Left L2  -  2/2            \par Left L3  -  2/2\par Left L4  -  2/2\par Left L5  -  2/2\par Left S1  -  2/2\par \par Right L2  -  2/2            \par Right L3  -  2/2\par Right L4  -  2/2\par Right L5  -  2/2\par Right S1  -  2/2\par \par Motor: \par Left L2 (hip flexion)                            5/5                \par Left L3 (knee extension)                   5/5                \par Left L4 (ankle dorsiflexion)                 5/5                \par Left L5 (long toe extensor)                5/5                \par Left S1 (ankle plantar flexion)           5/5\par \par Right L2 (hip flexion)                            5/5                \par Right L3 (knee extension)                   5/5                \par Right L4 (ankle dorsiflexion)                 5/5                \par Right L5 (long toe extensor)                5/5                \par Right S1 (ankle plantar flexion)           5/5 [de-identified] : 65 year old female 2 weeks s/p L3-L5 decompression [de-identified] : Patient is progressing well.  Surgical site pain controlled with Tylenol.  Her leg symptoms have resolved.  She is neurologically intact.  Her incision is well-healed.  Continue no heavy lifting >10 lbs, twisting or bending for 4 more weeks. May shower no baths.  Continue Tylenol as needed for pain.  Followup in 4 weeks.  We discussed red flag symptoms that would require emergent evaluation. She knows to call with any questions or concerns or if her symptoms acutely worsen.

## 2021-05-25 ENCOUNTER — APPOINTMENT (OUTPATIENT)
Dept: ORTHOPEDIC SURGERY | Facility: CLINIC | Age: 66
End: 2021-05-25
Payer: COMMERCIAL

## 2021-05-25 PROCEDURE — 99024 POSTOP FOLLOW-UP VISIT: CPT

## 2021-05-25 RX ORDER — TIZANIDINE 2 MG/1
2 TABLET ORAL EVERY 6 HOURS
Qty: 40 | Refills: 0 | Status: ACTIVE | COMMUNITY
Start: 2021-05-25 | End: 1900-01-01

## 2021-05-26 NOTE — HISTORY OF PRESENT ILLNESS
[___ Weeks Post Op] : [unfilled] weeks post op [de-identified] : s/p L3-L5 decompression [de-identified] : 65 year old female 6 weeks s/p L3-L5 decompression.  Patient has been continuing to advance activities and is walking unrestricted.  She has no pain in her lower extremities.  She has some tightness in her low back. She denies radicular pain, recent illness, fevers, numbness, weakness, balance problems, saddle anesthesia, urinary retention or fecal incontinence. [de-identified] : MSK:\par Lumbar spine:\par Incision well healed\par \par NEURO EXAM:\par Sensation \par Left L2  -  2/2            \par Left L3  -  2/2\par Left L4  -  2/2\par Left L5  -  2/2\par Left S1  -  2/2\par \par Right L2  -  2/2            \par Right L3  -  2/2\par Right L4  -  2/2\par Right L5  -  2/2\par Right S1  -  2/2\par \par Motor: \par Left L2 (hip flexion)                            5/5                \par Left L3 (knee extension)                   5/5                \par Left L4 (ankle dorsiflexion)                 5/5                \par Left L5 (long toe extensor)                5/5                \par Left S1 (ankle plantar flexion)           5/5\par \par Right L2 (hip flexion)                            5/5                \par Right L3 (knee extension)                   5/5                \par Right L4 (ankle dorsiflexion)                 5/5                \par Right L5 (long toe extensor)                5/5                \par Right S1 (ankle plantar flexion)           5/5 [de-identified] : 65 year old female 6 weeks s/p L3-L5 decompression [de-identified] : Patient continues to progress well.  She is walking unrestricted.  She feels she is walking with better posture.  She has no radicular pain and is neurologically intact. She is happy with her progress.  She can continue to advance activities as tolerated.  She will followup in 2 months.  We discussed red flag symptoms that would require emergent evaluation. She knows to call with any questions or concerns or if her symptoms acutely worsen.

## 2021-07-27 ENCOUNTER — APPOINTMENT (OUTPATIENT)
Dept: ORTHOPEDIC SURGERY | Facility: CLINIC | Age: 66
End: 2021-07-27
Payer: COMMERCIAL

## 2021-07-27 PROCEDURE — 99072 ADDL SUPL MATRL&STAF TM PHE: CPT

## 2021-07-27 PROCEDURE — 99214 OFFICE O/P EST MOD 30 MIN: CPT

## 2021-07-27 NOTE — PHYSICAL EXAM
[de-identified] : General: No acute distress, conversant, well-nourished.\par Head: Normocephalic, atraumatic\par Neck: trachea midline, FROM\par Heart: normotensive and normal rate and rhythm\par Lungs: No labored breathing\par Skin: No abrasions, no rashes, no edema\par Psych: Alert and oriented to person, place and time\par Extremities: no peripheral edema or digital cyanosis\par Gait: Normal gait. Can perform tandem gait.  \par Vascular: warm and well perfused distally, palpable distal pulses\par \par MSK:\par Lumbar spine:\par Incision well healed\par No tenderness to palpation\par \par NEURO EXAM:\par Sensation \par Left L2  -  2/2            \par Left L3  -  2/2\par Left L4  -  2/2\par Left L5  -  2/2\par Left S1  -  2/2\par \par Right L2  -  2/2            \par Right L3  -  2/2\par Right L4  -  2/2\par Right L5  -  2/2\par Right S1  -  2/2\par \par Motor: \par Left L2 (hip flexion)                            5/5                \par Left L3 (knee extension)                   5/5                \par Left L4 (ankle dorsiflexion)                 5/5                \par Left L5 (long toe extensor)                5/5                \par Left S1 (ankle plantar flexion)           5/5\par \par Right L2 (hip flexion)                            5/5                \par Right L3 (knee extension)                   5/5                \par Right L4 (ankle dorsiflexion)                 5/5                \par Right L5 (long toe extensor)                5/5                \par Right S1 (ankle plantar flexion)           5/5\par \par Reflexes: Normal and symmetric\par Negative clonus.  Down-going Babinski.

## 2021-07-27 NOTE — HISTORY OF PRESENT ILLNESS
[de-identified] : 65 year old female 106 days s/p L3-L5 decompression.  She continues to have complete resolution of her leg pain.  She is back to most activities.  She walks without limitation.  She went dancing at a wedding in heels.  She does reports low back discomfort and stiffness at times.  She also has right upper extremity numbness which her neurologist prescribed her gabapentin 100.  She denies radicular pain, recent illness, fevers, weakness, balance problems, saddle anesthesia, urinary retention or fecal incontinence.

## 2021-07-27 NOTE — PHYSICAL EXAM
[de-identified] : General: No acute distress, conversant, well-nourished.\par Head: Normocephalic, atraumatic\par Neck: trachea midline, FROM\par Heart: normotensive and normal rate and rhythm\par Lungs: No labored breathing\par Skin: No abrasions, no rashes, no edema\par Psych: Alert and oriented to person, place and time\par Extremities: no peripheral edema or digital cyanosis\par Gait: Normal gait. Can perform tandem gait.  \par Vascular: warm and well perfused distally, palpable distal pulses\par \par MSK:\par Lumbar spine:\par Incision well healed\par No tenderness to palpation\par \par NEURO EXAM:\par Sensation \par Left L2  -  2/2            \par Left L3  -  2/2\par Left L4  -  2/2\par Left L5  -  2/2\par Left S1  -  2/2\par \par Right L2  -  2/2            \par Right L3  -  2/2\par Right L4  -  2/2\par Right L5  -  2/2\par Right S1  -  2/2\par \par Motor: \par Left L2 (hip flexion)                            5/5                \par Left L3 (knee extension)                   5/5                \par Left L4 (ankle dorsiflexion)                 5/5                \par Left L5 (long toe extensor)                5/5                \par Left S1 (ankle plantar flexion)           5/5\par \par Right L2 (hip flexion)                            5/5                \par Right L3 (knee extension)                   5/5                \par Right L4 (ankle dorsiflexion)                 5/5                \par Right L5 (long toe extensor)                5/5                \par Right S1 (ankle plantar flexion)           5/5\par \par Reflexes: Normal and symmetric\par Negative clonus.  Down-going Babinski.

## 2021-07-27 NOTE — HISTORY OF PRESENT ILLNESS
[de-identified] : 65 year old female 106 days s/p L3-L5 decompression.  She continues to have complete resolution of her leg pain.  She is back to most activities.  She walks without limitation.  She went dancing at a wedding in heels.  She does reports low back discomfort and stiffness at times.  She also has right upper extremity numbness which her neurologist prescribed her gabapentin 100.  She denies radicular pain, recent illness, fevers, weakness, balance problems, saddle anesthesia, urinary retention or fecal incontinence.

## 2021-07-27 NOTE — ASSESSMENT
[FreeTextEntry1] : 65 year old female 106 days s/p L3-L5 decompression.  Patient is progressing well.  She has no pain in her legs.  She has been walking unrestricted.  She does have some discomfort at times in her low back.  She can take diclofenac as needed for pain.  She can return to activities as tolerated.  She has numbness in her right upper extremity that bothers her when she is sleeping.  She was given a prescription for gabapentin 300 qhs.  She is otherwise neurologically intact.  She will followup in 2 months.  We discussed red flag symptoms that would require emergent evaluation. She knows to call with any questions or concerns or if her symptoms acutely worsen.

## 2021-08-16 NOTE — PRE-OP CHECKLIST - RESPIRATORY RATE (BREATHS/MIN)
Pt coming in from UC for intermittent abdominal pain x 2 days, first left side now right. Also with nausea last night. Fever last thursday. On arrival pt awake and alert, NAD. Abd TTP on RLQ.     Apical pulse auscultated and correlates with VS machine. No medical history. No surgeries. NKDA. VUTD.
20

## 2021-09-09 ENCOUNTER — APPOINTMENT (OUTPATIENT)
Dept: ORTHOPEDIC SURGERY | Facility: CLINIC | Age: 66
End: 2021-09-09
Payer: COMMERCIAL

## 2021-09-09 DIAGNOSIS — M46.1 SACROILIITIS, NOT ELSEWHERE CLASSIFIED: ICD-10-CM

## 2021-09-09 PROCEDURE — 99214 OFFICE O/P EST MOD 30 MIN: CPT

## 2021-09-09 PROCEDURE — 73502 X-RAY EXAM HIP UNI 2-3 VIEWS: CPT | Mod: RT

## 2021-09-09 NOTE — HISTORY OF PRESENT ILLNESS
[de-identified] : 65 year old female 5 months s/p L3-L5 decompression.  In the last 2 weeks she has started to have right sided pain in her low back and right hip.   She denies radicular pain, recent illness, fevers, numbness, weakness, balance problems, saddle anesthesia, urinary retention or fecal incontinence.  She went to a session of PT without relief.  She is here for evaluation.

## 2021-09-09 NOTE — PHYSICAL EXAM
[de-identified] : General: No acute distress, conversant, well-nourished.\par Head: Normocephalic, atraumatic\par Neck: trachea midline, FROM\par Heart: normotensive and normal rate and rhythm\par Lungs: No labored breathing\par Skin: No abrasions, no rashes, no edema\par Psych: Alert and oriented to person, place and time\par Extremities: no peripheral edema or digital cyanosis\par Gait: Normal gait. Can perform tandem gait.  \par Vascular: warm and well perfused distally, palpable distal pulses\par \par MSK:\par Right hip\par no TTP\par no pain with pROM\par +LIANE test\par \par Lumbar spine:\par Incision well healed\par No tenderness to palpation\par \par NEURO EXAM:\par Sensation \par Left L2  -  2/2            \par Left L3  -  2/2\par Left L4  -  2/2\par Left L5  -  2/2\par Left S1  -  2/2\par \par Right L2  -  2/2            \par Right L3  -  2/2\par Right L4  -  2/2\par Right L5  -  2/2\par Right S1  -  2/2\par \par Motor: \par Left L2 (hip flexion)                            5/5                \par Left L3 (knee extension)                   5/5                \par Left L4 (ankle dorsiflexion)                 5/5                \par Left L5 (long toe extensor)                5/5                \par Left S1 (ankle plantar flexion)           5/5\par \par Right L2 (hip flexion)                            5/5                \par Right L3 (knee extension)                   5/5                \par Right L4 (ankle dorsiflexion)                 5/5                \par Right L5 (long toe extensor)                5/5                \par Right S1 (ankle plantar flexion)           5/5\par \par Reflexes: Normal and symmetric\par Negative clonus.  Down-going Babinski.   [de-identified] : I ordered right hip radiographs to evaluate the patient's pain.\par \par Right hip 2 view radiographs obtained in the office today shows no fracture or dislocation.  Mild age-related degenerative changes.\par

## 2021-09-30 ENCOUNTER — APPOINTMENT (OUTPATIENT)
Dept: ORTHOPEDIC SURGERY | Facility: CLINIC | Age: 66
End: 2021-09-30
Payer: COMMERCIAL

## 2021-09-30 PROCEDURE — 99214 OFFICE O/P EST MOD 30 MIN: CPT

## 2021-09-30 NOTE — PHYSICAL EXAM
[de-identified] : General: No acute distress, conversant, well-nourished.\par Head: Normocephalic, atraumatic\par Neck: trachea midline, FROM\par Heart: normotensive and normal rate and rhythm\par Lungs: No labored breathing\par Skin: No abrasions, no rashes, no edema\par Psych: Alert and oriented to person, place and time\par Extremities: no peripheral edema or digital cyanosis\par Gait: Normal gait. Can perform tandem gait.  \par Vascular: warm and well perfused distally, palpable distal pulses\par \par MSK:\par Lumbar spine:\par Incision well healed\par No tenderness to palpation\par \par NEURO EXAM:\par Sensation \par Left L2  -  2/2            \par Left L3  -  2/2\par Left L4  -  2/2\par Left L5  -  2/2\par Left S1  -  2/2\par \par Right L2  -  2/2            \par Right L3  -  2/2\par Right L4  -  2/2\par Right L5  -  2/2\par Right S1  -  2/2\par \par Motor: \par Left L2 (hip flexion)                            5/5                \par Left L3 (knee extension)                   5/5                \par Left L4 (ankle dorsiflexion)                 5/5                \par Left L5 (long toe extensor)                5/5                \par Left S1 (ankle plantar flexion)           5/5\par \par Right L2 (hip flexion)                            5/5                \par Right L3 (knee extension)                   5/5                \par Right L4 (ankle dorsiflexion)                 5/5                \par Right L5 (long toe extensor)                5/5                \par Right S1 (ankle plantar flexion)           5/5\par \par Reflexes: Normal and symmetric\par Negative clonus.  Down-going Babinski.   [de-identified] : Right hip 2 view radiographs no fracture or dislocation.  Mild age-related degenerative changes.\par

## 2021-09-30 NOTE — ASSESSMENT
[FreeTextEntry1] : 65 year old female 5 months s/p L3-L5 decompression.  Since her last visit she had initial improvement of her pain with a steroid dose pack.  However after traveling to [place] her pain has returned in her low back.   She has no radicular pain and is neurologically intact. She was given a prescription for Diclofenac.  She was given a referral for physical therapy.  She will followup in 4 weeks.  We discussed red flag symptoms that would require emergent evaluation. She knows to call with any questions or concerns or if her symptoms acutely worsen.

## 2021-09-30 NOTE — HISTORY OF PRESENT ILLNESS
[de-identified] : 65 year old female 5 months s/p L3-L5 decompression. Since her last visit she initially had good resolution of her pain with a steroid dose pack.  However she recently traveled to [place] and had increased low back pain.   She denies radicular pain, recent illness, fevers, numbness, weakness, balance problems, saddle anesthesia, urinary retention or fecal incontinence.

## 2021-10-28 ENCOUNTER — APPOINTMENT (OUTPATIENT)
Dept: ORTHOPEDIC SURGERY | Facility: CLINIC | Age: 66
End: 2021-10-28

## 2021-11-11 ENCOUNTER — APPOINTMENT (OUTPATIENT)
Dept: ORTHOPEDIC SURGERY | Facility: CLINIC | Age: 66
End: 2021-11-11
Payer: COMMERCIAL

## 2021-11-11 VITALS — WEIGHT: 130 LBS | BODY MASS INDEX: 23.03 KG/M2

## 2021-11-11 DIAGNOSIS — M48.062 SPINAL STENOSIS, LUMBAR REGION WITH NEUROGENIC CLAUDICATION: ICD-10-CM

## 2021-11-11 DIAGNOSIS — G56.01 CARPAL TUNNEL SYNDROME, RIGHT UPPER LIMB: ICD-10-CM

## 2021-11-11 PROCEDURE — 99214 OFFICE O/P EST MOD 30 MIN: CPT

## 2021-11-13 PROBLEM — M48.062 LUMBAR STENOSIS WITH NEUROGENIC CLAUDICATION: Status: ACTIVE | Noted: 2021-02-25

## 2021-11-13 NOTE — REASON FOR VISIT
[Follow-Up Visit] : a follow-up visit for [Back Pain] : back pain [FreeTextEntry2] : and hip pain. Pt states pains are going away, she has not been taking any medication to help with pain

## 2021-11-13 NOTE — ASSESSMENT
[FreeTextEntry1] : 65 year old female 6 months s/p L3-L5 decompression.Since her last visit she reports the pain has resolved.  She is not taking any medications for pain.  She is doing activities as tolerated. She is neurologically intact.  She is happy with her progress.  She can continue Diclofenac as needed. She will continue her home exercise program. She will followup in 2-3 months. We discussed red flag symptoms that would require emergent evaluation. She knows to call with any questions or concerns or if her symptoms acutely worsen.

## 2021-11-13 NOTE — HISTORY OF PRESENT ILLNESS
[de-identified] : 65 year old female 6 months s/p L3-L5 decompression.Since her last visit she reports the pain has resolved.  She is not taking any medications for pain.  She is doing activities as tolerated. She denies radicular pain, recent illness, fevers, numbness, weakness, balance problems, saddle anesthesia, urinary retention or fecal incontinence.

## 2021-11-13 NOTE — PHYSICAL EXAM
[de-identified] : General: No acute distress, conversant, well-nourished.\par Head: Normocephalic, atraumatic\par Neck: trachea midline, FROM\par Heart: normotensive and normal rate and rhythm\par Lungs: No labored breathing\par Skin: No abrasions, no rashes, no edema\par Psych: Alert and oriented to person, place and time\par Extremities: no peripheral edema or digital cyanosis\par Gait: Normal gait. Can perform tandem gait.  \par Vascular: warm and well perfused distally, palpable distal pulses\par \par MSK:\par Lumbar spine:\par Incision well healed\par No tenderness to palpation\par \par NEURO EXAM:\par Sensation \par Left L2  -  2/2            \par Left L3  -  2/2\par Left L4  -  2/2\par Left L5  -  2/2\par Left S1  -  2/2\par \par Right L2  -  2/2            \par Right L3  -  2/2\par Right L4  -  2/2\par Right L5  -  2/2\par Right S1  -  2/2\par \par Motor: \par Left L2 (hip flexion)                            5/5                \par Left L3 (knee extension)                   5/5                \par Left L4 (ankle dorsiflexion)                 5/5                \par Left L5 (long toe extensor)                5/5                \par Left S1 (ankle plantar flexion)           5/5\par \par Right L2 (hip flexion)                            5/5                \par Right L3 (knee extension)                   5/5                \par Right L4 (ankle dorsiflexion)                 5/5                \par Right L5 (long toe extensor)                5/5                \par Right S1 (ankle plantar flexion)           5/5\par \par Reflexes: Normal and symmetric\par Negative clonus.  Down-going Babinski.   [de-identified] : Right hip 2 view radiographs no fracture or dislocation.  Mild age-related degenerative changes.\par

## 2021-11-24 ENCOUNTER — RX RENEWAL (OUTPATIENT)
Age: 66
End: 2021-11-24

## 2022-01-13 ENCOUNTER — TRANSCRIPTION ENCOUNTER (OUTPATIENT)
Age: 67
End: 2022-01-13

## 2022-01-14 ENCOUNTER — TRANSCRIPTION ENCOUNTER (OUTPATIENT)
Age: 67
End: 2022-01-14

## 2022-02-11 ENCOUNTER — APPOINTMENT (OUTPATIENT)
Dept: ORTHOPEDIC SURGERY | Facility: CLINIC | Age: 67
End: 2022-02-11

## 2022-02-17 ENCOUNTER — RX RENEWAL (OUTPATIENT)
Age: 67
End: 2022-02-17

## 2022-02-17 ENCOUNTER — APPOINTMENT (OUTPATIENT)
Dept: ORTHOPEDIC SURGERY | Facility: CLINIC | Age: 67
End: 2022-02-17
Payer: COMMERCIAL

## 2022-02-17 DIAGNOSIS — M53.3 SACROCOCCYGEAL DISORDERS, NOT ELSEWHERE CLASSIFIED: ICD-10-CM

## 2022-02-17 DIAGNOSIS — M54.16 RADICULOPATHY, LUMBAR REGION: ICD-10-CM

## 2022-02-17 PROCEDURE — 99214 OFFICE O/P EST MOD 30 MIN: CPT

## 2022-02-18 PROBLEM — M54.16 RIGHT LUMBAR RADICULOPATHY: Status: ACTIVE | Noted: 2020-12-16

## 2022-02-18 PROBLEM — M53.3 SACROILIAC PAIN: Status: ACTIVE | Noted: 2022-02-18

## 2022-02-18 NOTE — PHYSICAL EXAM
[de-identified] : General: No acute distress, conversant, well-nourished.\par Head: Normocephalic, atraumatic\par Neck: trachea midline, FROM\par Heart: normotensive and normal rate and rhythm\par Lungs: No labored breathing\par Skin: No abrasions, no rashes, no edema\par Psych: Alert and oriented to person, place and time\par Extremities: no peripheral edema or digital cyanosis\par Gait: Normal gait. Can perform tandem gait.  \par Vascular: warm and well perfused distally, palpable distal pulses\par \par MSK:\par Lumbar spine:\par Incision well healed\par no tenderness over incision or lumbar spine\par + tenderness to palpation over SI joints\par \par NEURO EXAM:\par Sensation \par Left L2  -  2/2            \par Left L3  -  2/2\par Left L4  -  2/2\par Left L5  -  2/2\par Left S1  -  2/2\par \par Right L2  -  2/2            \par Right L3  -  2/2\par Right L4  -  2/2\par Right L5  -  2/2\par Right S1  -  2/2\par \par Motor: \par Left L2 (hip flexion)                            5/5                \par Left L3 (knee extension)                   5/5                \par Left L4 (ankle dorsiflexion)                 5/5                \par Left L5 (long toe extensor)                5/5                \par Left S1 (ankle plantar flexion)           5/5\par \par Right L2 (hip flexion)                            5/5                \par Right L3 (knee extension)                   5/5                \par Right L4 (ankle dorsiflexion)                 5/5                \par Right L5 (long toe extensor)                5/5                \par Right S1 (ankle plantar flexion)           5/5\par \par Reflexes: Normal and symmetric\par Negative clonus.  Down-going Babinski.   [de-identified] : I ordered radiographs to evaluate the patient's symptoms.\par \par Lumbar 4 view radiographs taken in the office today show no dislocation or fracture. s/p L3-L5 lami Lumbar spondylosis.  No instability on dynamic series.\par \par Right hip 2 view radiographs no fracture or dislocation.  Mild age-related degenerative changes.\par

## 2022-02-18 NOTE — ASSESSMENT
[FreeTextEntry1] : 65 year old female 9 months s/p L3-L5 decompression. She has no pain at the site of the surgery.  She has no radicular pain.  She does have paresthesias into her right arm but is otherwise neurologically intact.  She notes that about a week ago she was carrying some heavy shopping bags when she had pain in her sacral area. She is tender of the SI joints.  She was given a prescription for methocarbamol and she will continue diclofenac.  She can consider SI joint injections.  We reviewed her lumbar MRI which shows good decompression of the neural elements.  There is facet arthritis and a synovial cyst however the patient is not symptomatic from these findings.  She will followup in 4 weeks. We discussed red flag symptoms that would require emergent evaluation. She knows to call with any questions or concerns or if her symptoms acutely worsen.

## 2022-02-18 NOTE — HISTORY OF PRESENT ILLNESS
[de-identified] : 65 year old female 9 months s/p L3-L5 decompression. She has no pain at the site of the surgery.  She has no radicular pain.  She notes that about a week ago she was carrying some heavy shopping bags when she had pain in her sacral area.  She is not taking any medications for pain.  She denies radicular pain, recent illness, fevers, weakness, balance problems, saddle anesthesia, urinary retention or fecal incontinence. She had a recent lumbar MRI.

## 2022-03-24 ENCOUNTER — APPOINTMENT (OUTPATIENT)
Dept: ORTHOPEDIC SURGERY | Facility: CLINIC | Age: 67
End: 2022-03-24
Payer: COMMERCIAL

## 2022-03-24 DIAGNOSIS — M54.50 LOW BACK PAIN, UNSPECIFIED: ICD-10-CM

## 2022-03-24 DIAGNOSIS — M21.611 BUNION OF RIGHT FOOT: ICD-10-CM

## 2022-03-24 DIAGNOSIS — M54.2 CERVICALGIA: ICD-10-CM

## 2022-03-24 PROCEDURE — 99214 OFFICE O/P EST MOD 30 MIN: CPT

## 2022-03-26 NOTE — PROGRESS NOTE ADULT - NSICDXPILOT_GEN_ALL_CORE
[place]
.  LABS:                         12.1   5.95  )-----------( 181      ( 25 Mar 2022 23:45 )             39.3     03-25    137  |  104  |  10  ----------------------------<  166<H>  4.5   |  21<L>  |  0.66    Ca    9.3      25 Mar 2022 23:45    TPro  6.9  /  Alb  4.1  /  TBili  0.4  /  DBili  x   /  AST  27  /  ALT  19  /  AlkPhos  74  03-25        RADIOLOGY, EKG & ADDITIONAL TESTS: Reviewed.   < from: CT Head No Cont (03.25.22 @ 23:57) >    MISCELLANEOUS:  None.    IMPRESSION: No evidence of venous sinus thrombosis.    --- End of Report ---      < end of copied text >    < from: CT Venogram Brain w/ IV Cont (03.25.22 @ 23:57) >      IMPRESSION: No evidence of venous sinus thrombosis.    --- End of Report ---      < end of copied text >

## 2022-03-27 PROBLEM — M54.2 NECK PAIN: Status: ACTIVE | Noted: 2021-05-25

## 2022-03-27 PROBLEM — M54.50 LOW BACK PAIN: Status: ACTIVE | Noted: 2021-09-30

## 2022-03-27 NOTE — PHYSICAL EXAM
[de-identified] : General: No acute distress, conversant, well-nourished.\par Head: Normocephalic, atraumatic\par Neck: trachea midline, FROM\par Heart: normotensive and normal rate and rhythm\par Lungs: No labored breathing\par Skin: No abrasions, no rashes, no edema\par Psych: Alert and oriented to person, place and time\par Extremities: no peripheral edema or digital cyanosis\par Gait: Normal gait. Can perform tandem gait.  \par Vascular: warm and well perfused distally, palpable distal pulses\par \par MSK:\par Lumbar spine:\par Incision well healed\par no tenderness over incision or lumbar spine\par + tenderness to palpation over SI joints\par \par NEURO EXAM:\par Sensation \par Left L2  -  2/2            \par Left L3  -  2/2\par Left L4  -  2/2\par Left L5  -  2/2\par Left S1  -  2/2\par \par Right L2  -  2/2            \par Right L3  -  2/2\par Right L4  -  2/2\par Right L5  -  2/2\par Right S1  -  2/2\par \par Motor: \par Left L2 (hip flexion)                            5/5                \par Left L3 (knee extension)                   5/5                \par Left L4 (ankle dorsiflexion)                 5/5                \par Left L5 (long toe extensor)                5/5                \par Left S1 (ankle plantar flexion)           5/5\par \par Right L2 (hip flexion)                            5/5                \par Right L3 (knee extension)                   5/5                \par Right L4 (ankle dorsiflexion)                 5/5                \par Right L5 (long toe extensor)                5/5                \par Right S1 (ankle plantar flexion)           5/5\par \par Reflexes: Normal and symmetric\par Negative clonus.  Down-going Babinski.   [de-identified] : Lumbar 4 view radiographs no dislocation or fracture. s/p L3-L5 lami Lumbar spondylosis.  No instability on dynamic series.\par \par Right hip 2 view radiographs no fracture or dislocation.  Mild age-related degenerative changes.\par

## 2022-03-27 NOTE — HISTORY OF PRESENT ILLNESS
[de-identified] : 65 year old female 10 months s/p L3-L5 decompression. She has no pain at the site of the surgery.  She has no radicular pain. She is not taking any medications for pain.  She denies radicular pain, recent illness, fevers, weakness, balance problems, saddle anesthesia, urinary retention or fecal incontinence. She continues to have parethesias in her right hand.  She sees a neurologist. She also reports some hip pain.

## 2022-03-27 NOTE — ASSESSMENT
[FreeTextEntry1] : 65 year old female 10 months s/p L3-L5 decompression. She has no pain at the site of the surgery.  She has no radicular pain. She is not taking any medications for pain.  She denies radicular pain. She continues to have numbness in her right hand.  She is otherwise neurologically intact.  She will be sent for a cervical MRI. She will followup once the MRI is complete.  We discussed red flag symptoms that would require emergent evaluation. She knows to call with any questions or concerns or if her symptoms acutely worsen.

## 2022-04-06 ENCOUNTER — RESULT REVIEW (OUTPATIENT)
Age: 67
End: 2022-04-06

## 2022-04-06 ENCOUNTER — APPOINTMENT (OUTPATIENT)
Dept: ORTHOPEDIC SURGERY | Facility: CLINIC | Age: 67
End: 2022-04-06
Payer: COMMERCIAL

## 2022-04-06 ENCOUNTER — OUTPATIENT (OUTPATIENT)
Dept: OUTPATIENT SERVICES | Facility: HOSPITAL | Age: 67
LOS: 1 days | End: 2022-04-06
Payer: COMMERCIAL

## 2022-04-06 VITALS — BODY MASS INDEX: 23.39 KG/M2 | HEIGHT: 63 IN | RESPIRATION RATE: 16 BRPM | WEIGHT: 132 LBS

## 2022-04-06 DIAGNOSIS — Z98.890 OTHER SPECIFIED POSTPROCEDURAL STATES: Chronic | ICD-10-CM

## 2022-04-06 DIAGNOSIS — M20.21 HALLUX RIGIDUS, RIGHT FOOT: ICD-10-CM

## 2022-04-06 PROCEDURE — 73630 X-RAY EXAM OF FOOT: CPT

## 2022-04-06 PROCEDURE — 73610 X-RAY EXAM OF ANKLE: CPT

## 2022-04-06 PROCEDURE — 73630 X-RAY EXAM OF FOOT: CPT | Mod: 26,RT

## 2022-04-06 PROCEDURE — 73610 X-RAY EXAM OF ANKLE: CPT | Mod: 26,RT

## 2022-04-06 PROCEDURE — 99214 OFFICE O/P EST MOD 30 MIN: CPT

## 2022-04-06 RX ORDER — MELOXICAM 15 MG/1
15 TABLET ORAL
Qty: 30 | Refills: 2 | Status: ACTIVE | COMMUNITY
Start: 2022-04-06 | End: 1900-01-01

## 2022-04-06 RX ORDER — GABAPENTIN 300 MG/1
300 CAPSULE ORAL
Qty: 90 | Refills: 0 | Status: DISCONTINUED | COMMUNITY
Start: 2020-12-16 | End: 2022-04-06

## 2022-04-06 RX ORDER — DOCUSATE SODIUM 100 MG/1
100 CAPSULE ORAL TWICE DAILY
Qty: 28 | Refills: 0 | Status: DISCONTINUED | COMMUNITY
Start: 2021-04-14 | End: 2022-04-06

## 2022-04-06 RX ORDER — METHYLPREDNISOLONE 4 MG/1
4 TABLET ORAL
Qty: 1 | Refills: 0 | Status: DISCONTINUED | COMMUNITY
Start: 2021-09-09 | End: 2022-04-06

## 2022-04-06 RX ORDER — DICLOFENAC SODIUM 75 MG/1
75 TABLET, DELAYED RELEASE ORAL
Qty: 60 | Refills: 1 | Status: DISCONTINUED | COMMUNITY
Start: 2021-09-30 | End: 2022-04-06

## 2022-04-06 RX ORDER — DICLOFENAC SODIUM 1% 10 MG/G
1 GEL TOPICAL
Qty: 1 | Refills: 4 | Status: ACTIVE | COMMUNITY
Start: 2022-04-06 | End: 1900-01-01

## 2022-04-06 RX ORDER — GABAPENTIN 300 MG/1
300 CAPSULE ORAL
Qty: 30 | Refills: 2 | Status: DISCONTINUED | COMMUNITY
Start: 2021-07-27 | End: 2022-04-06

## 2022-04-06 RX ORDER — DICLOFENAC SODIUM 75 MG/1
75 TABLET, DELAYED RELEASE ORAL
Qty: 60 | Refills: 0 | Status: DISCONTINUED | COMMUNITY
Start: 2020-12-16 | End: 2022-04-06

## 2022-04-06 RX ORDER — METHYLPREDNISOLONE 4 MG/1
4 TABLET ORAL
Qty: 1 | Refills: 0 | Status: DISCONTINUED | COMMUNITY
Start: 2021-09-30 | End: 2022-04-06

## 2022-04-06 RX ORDER — METHOCARBAMOL 500 MG/1
500 TABLET, FILM COATED ORAL 3 TIMES DAILY
Qty: 21 | Refills: 1 | Status: DISCONTINUED | COMMUNITY
Start: 2022-02-17 | End: 2022-04-06

## 2022-04-10 PROBLEM — M20.21 ACQUIRED HALLUX RIGIDUS OF RIGHT FOOT: Status: ACTIVE | Noted: 2022-04-10

## 2022-04-27 ENCOUNTER — APPOINTMENT (OUTPATIENT)
Dept: ORTHOPEDIC SURGERY | Facility: CLINIC | Age: 67
End: 2022-04-27
Payer: COMMERCIAL

## 2022-04-27 DIAGNOSIS — M54.12 RADICULOPATHY, CERVICAL REGION: ICD-10-CM

## 2022-04-27 DIAGNOSIS — G95.20 UNSPECIFIED CORD COMPRESSION: ICD-10-CM

## 2022-04-27 PROCEDURE — 99214 OFFICE O/P EST MOD 30 MIN: CPT

## 2022-04-27 NOTE — ASSESSMENT
[FreeTextEntry1] : 65 year old female 11 months s/p L3-L5 decompression. She is also here for followup for acute exacerbation of chronic neck pan and cervical radiculopathy. The pain radiates to her upper extremities right worse than left.  She also notes numbness in her left upper extremity.  She has been dropping items with her left hand (she admits to breaking several glasses).  She is weaker in the right hand. She is otherwise neurologically intact.  We reviewed her cervical MRI which shows degenerative changes including C5-C6 moderate cord compression.  Her symptoms are consistent with cervical radiculopathy and myelopathy. We discussed the natural history of cord compression. We discussed treatment including surgery: ACDF C5-C6.  We discussed the risks and benefits.  The risks include anesthesia, blood loss, need for blood transfusion, clots, stroke, myocardial infarct, chronic pain, damage to neurovascular function, dysphagia, wound complications, nonunion, hardware failure, durotomy, infection, Covid-19 and need for reoperation. The patient understands the risks.  She asked several great questions all of which were answered. She will be scheduled for surgery. She will require medical clearance and a Covid-19 test prior to surgery. We discussed red flag symptoms that would require emergent evaluation. She knows to call with any questions or concerns or if her symptoms acutely worsen.

## 2022-04-27 NOTE — PHYSICAL EXAM
[de-identified] : General: No acute distress, conversant, well-nourished.\par Head: Normocephalic, atraumatic\par Neck: trachea midline, FROM\par Heart: normotensive and normal rate and rhythm\par Lungs: No labored breathing\par Skin: No abrasions, no rashes, no edema\par Psych: Alert and oriented to person, place and time\par Extremities: no peripheral edema or digital cyanosis\par Gait: Normal gait. Can perform tandem gait.  \par Vascular: warm and well perfused distally, palpable distal pulses\par \par MSK:\par Spine: \par No tenderness to palpation.  No step-off, no deformity.\par \par NEURO:\par Sensation \par          Left           \par C5     2/2               \par C6     2/2               \par C7     2/2               \par C8     2/2              \par T1     2/2             \par \par          Right         \par C5     2/2               \par C6     1/2               \par C7     1/2               \par C8     1/2              \par T1     2/2      \par \par Motor: \par                                                Left             \par C5 (deltoid abduction)             5/5               \par C6 (biceps flexion)                   5/5                \par C7 (triceps extension)             5/5               \par C8 (finger flexion)                     5/5               \par T1 (interosseous)                     5/5           \par \par                                                Right           \par C5 (deltoid abduction)             5/5               \par C6 (biceps flexion)                   5/5                \par C7 (triceps extension)             5/5               \par C8 (finger flexion)                     5/5               \par T1 (interosseous)                     5/5                     \par \par Sensation \par Left L2  -  2/2            \par Left L3  -  2/2\par Left L4  -  2/2\par Left L5  -  2/2\par Left S1  -  2/2\par \par Right L2  -  2/2            \par Right L3  -  2/2\par Right L4  -  2/2\par Right L5  -  2/2\par Right S1  -  2/2\par \par Motor: \par Left L2 (hip flexion)                            5/5                \par Left L3 (knee extension)                   5/5                \par Left L4 (ankle dorsiflexion)                 5/5                \par Left L5 (long toe extensor)                5/5                \par Left S1 (ankle plantar flexion)           5/5\par \par Right L2 (hip flexion)                            5/5                \par Right L3 (knee extension)                   5/5                \par Right L4 (ankle dorsiflexion)                 5/5                \par Right L5 (long toe extensor)                5/5                \par Right S1 (ankle plantar flexion)           5/5\par \par Reflexes: Normal and symmetric\par Negative Spurling’s test.  Positive Toussaint’s reflex.  \par Negative clonus.  Down-going Babinski. [de-identified] : Cervical MRI (4/6/22): 1. Multilevel degenerative changes of the cervical spine contributing to moderate to marked spinal canal stenosis and moderate cord compression at C5-6, mild to moderate spinal canal stenosis/cord impingement at C3-4, mild at C4-5 and C6-7. Mild ventral thecal sac effacement at C2-3.\par 2. Multilevel neural foraminal stenosis, most severe on the right at C4-5 and C5-6.\par 3. Moderate multilevel facet arthrosis.

## 2022-04-27 NOTE — HISTORY OF PRESENT ILLNESS
[de-identified] : 65 year old female 11 months s/p L3-L5 decompression. She is also here for followup for acute exacerbation of chronic neck pan and cervical radiculopathy. The pain radiates to her upper extremities right worse than left.  She also notes numbness in her left upper extremity.  She has been dropping items with her left hand (she admits to breaking several glasses).  These symptoms have been worsening.  She denies recent illness, fevers, saddle anesthesia, urinary retention or fecal incontinence. She is here to review her cervical MRI.

## 2022-05-23 ENCOUNTER — APPOINTMENT (OUTPATIENT)
Dept: ORTHOPEDIC SURGERY | Facility: HOSPITAL | Age: 67
End: 2022-05-23

## 2022-06-12 NOTE — REASON FOR VISIT
Clinic Care Coordination Contact  Community Health Worker Initial Outreach    CHW Initial Information Gathering:  Referral Source: IP Report  Preferred Hospital: [facility]  [phone]  Current living arrangement:: I live in a private home with family (Sister and sister's boyfriend)  Type of residence:: Private home - stairs  Community Resources: None (Thinking of applying for meals on wheels)  Supplies Currently Used at Home: Other (Blood pressure monitor)  Equipment Currently Used at Home: walker, standard, cane, straight, other (see comments) (She uses her cane most of the time rather than the walker.)  Informal Support system:: Family, Friends  No PCP office visit in Past Year: No  Transportation means:: Friend (The bus stop is about half a mile away and she is no longer able to walk to it.)  CHW Additional Questions  If ED/Hospital discharge, follow-up appointment scheduled as recommended?: No  Patient agreeable to assistance with scheduling?: No  Patient declined (specify): Patient said she would just like to follow up with her specialist and not her primary care provider  Medication changes made following ED/Hospital discharge?: Yes, patient to be scheduled with CC RN/SW within approx 1 business day  [facility] active[facility] Yes  Patient sent Social Determinants of Health questionnaire?: Yes    Patient accepts CC: Yes. Patient scheduled for assessment with CCC RN  on 06/14/2022 at 3:00 PM. Patient noted desire to discuss financial resources to pay for a  / her friend who drives her to all of her appointments. She is also interested in applying for meals on wheels. She may benefit from additional transportation resources.     [facility]: Post-Discharge Note  SITUATION                                                      Admission:    Admission Date: 06/07/22   Reason for Admission: elective aortic valve replacement via TAVR  Discharge:   Discharge Date: 06/10/22  Discharge  "Diagnosis: Severe Aortic Stenosis status post TAVR with 26 mm Medtronic Evolut Pro+ Valve,   Conduction abnormality post procedure,   Acute infarct of left frontal and cerebellar region, likely embolic,  History of DM2; Insulin Dependent,  Coronary Artery Disease with PCI to Ostial RCA,  Essential HTN,  HFpEF,  Headache,  Muscle spasm,  Hypothyroidism - Continue home synthroid,  Migraines- Continue home topamax,  CKD Stage 3b. Baseline Cr is about 1.3 - 1.4    BACKGROUND                                                      Per hospital discharge summary and inpatient provider notes:    Ade Wilkins is a 71 year old female admitted on 6/7/2022. She has a PMH most remarkable for DM2, hypothyroidism, severe aortic stenosis, who presented to the hospital today electively for aortic valve replacement via TAVR. She is being admitted post procedural for further cares.     Patient was evaluated as an outpatient for progressive fatigue and DENG. She was found to have severe aortic stenosis and was a TAVR candidate. During her evaluation she was found to have coronary artery disease of the right ostial coronary artery. She had her procedure this morning which was complicated by temporary heart block requiring about 10 minute of pacing during the procedure which has since resolved.     She is now feeling well, she has no fevers, chills, chest pain, SOB, abdominal pain, nausea, vomiting, diarrhea. No dizziness, light-headedness, syncope, or presyncope.    ASSESSMENT      Enrollment  Primary Care Care Coordination Status: Potential    Discharge Assessment  How are you doing now that you are home?: \"I'm fine\"  How are your symptoms? (Red Flag symptoms escalate to triage hotline per guidelines): Improved  Do you feel your condition is stable enough to be safe at home until your provider visit?: Yes  Does the patient have their discharge instructions? : Yes  Does the patient have questions regarding their discharge instructions? : " No  Were you started on any new medications or were there changes to any of your previous medications? : Yes  Does the patient have all of their medications?: No (see comment) (She has all of her rx except one that still needs to be picked up. Someone picked up one rx for her but she is still getting notifications to pick it up. CHW offered to call pharmacy but she said she will let them know when she picks up her other rx.)  Do you have questions regarding any of your medications? : No  Do you have all of your needed medical supplies or equipment (DME)?  (i.e. oxygen tank, CPAP, cane, etc.): Yes  Discharge follow-up appointment scheduled within 14 calendar days? : No  Is patient agreeable to assistance with scheduling? : No    Post-op (CHW CTA Only)  If the patient had a surgery or procedure, do they have any questions for a nurse?: No      PLAN                                                      Outpatient Plan:      Follow up with primary care provider, Judith Aviles, within 7-30 days for hospital follow- up.  The following labs/tests are recommended: CBC, BMP during follow-up with cardiology. Lipid profile in 3mo     Follow up with cardiology on 6/16 for post TAVR follow-up     Follow up with neurology in 6-8 weeks, [facility] of neurology.        Future Appointments   Date Time Provider Department [place]   6/14/2022  3:00 PM OX CCC RN OXN OX   6/16/2022  1:10 PM [facility]   6/23/2022  9:30 AM 2, Sh Cardiac Rehab [facility]   7/7/2022 11:00 AM [id number] SHCVCV CVIMG   7/7/2022 12:15 PM [facility]   7/7/2022  1:30 PM Latasha Calderon PA-C [facility] PSA CLIN   7/21/2022  9:00 AM [id number] OXDEX OX   7/26/2022  1:30 PM Rafael Shin   8/16/2022  9:30 AM Olga Lidia Hoover MD [facility]   11/21/2022  1:30 PM Roxanne Masterson PA-C [facility]         For any urgent concerns, please contact our 24 hour nurse triage  line: [phone] ([phone])       Kailey Foley  Community Health Worker  [facility]  Ph: [phone]     [Follow-Up Visit] : a follow-up visit for [Back Pain] : back pain

## 2022-06-15 ENCOUNTER — APPOINTMENT (OUTPATIENT)
Dept: ORTHOPEDIC SURGERY | Facility: CLINIC | Age: 67
End: 2022-06-15
Payer: COMMERCIAL

## 2022-06-15 VITALS — BODY MASS INDEX: 23.39 KG/M2 | HEIGHT: 63 IN | WEIGHT: 132 LBS | RESPIRATION RATE: 16 BRPM

## 2022-06-15 DIAGNOSIS — M21.6X1 OTHER ACQUIRED DEFORMITIES OF RIGHT FOOT: ICD-10-CM

## 2022-06-15 DIAGNOSIS — Q84.6 OTHER CONGENITAL MALFORMATIONS OF NAILS: ICD-10-CM

## 2022-06-15 DIAGNOSIS — M65.28 CALCIFIC TENDINITIS, OTHER SITE: ICD-10-CM

## 2022-06-15 DIAGNOSIS — R22.0 LOCALIZED SWELLING, MASS AND LUMP, HEAD: ICD-10-CM

## 2022-06-15 DIAGNOSIS — M20.11 HALLUX VALGUS (ACQUIRED), RIGHT FOOT: ICD-10-CM

## 2022-06-15 PROCEDURE — 73630 X-RAY EXAM OF FOOT: CPT | Mod: LT

## 2022-06-15 PROCEDURE — 99214 OFFICE O/P EST MOD 30 MIN: CPT

## 2022-06-16 ENCOUNTER — NON-APPOINTMENT (OUTPATIENT)
Age: 67
End: 2022-06-16